# Patient Record
Sex: MALE | Race: WHITE | NOT HISPANIC OR LATINO | Employment: UNEMPLOYED | URBAN - METROPOLITAN AREA
[De-identification: names, ages, dates, MRNs, and addresses within clinical notes are randomized per-mention and may not be internally consistent; named-entity substitution may affect disease eponyms.]

---

## 2017-01-12 ENCOUNTER — HOSPITAL ENCOUNTER (EMERGENCY)
Facility: HOSPITAL | Age: 22
Discharge: LEFT BEFORE DISCHARGE | End: 2017-01-12
Attending: EMERGENCY MEDICINE

## 2017-01-12 VITALS
HEART RATE: 85 BPM | HEIGHT: 62 IN | SYSTOLIC BLOOD PRESSURE: 137 MMHG | DIASTOLIC BLOOD PRESSURE: 73 MMHG | TEMPERATURE: 97 F | BODY MASS INDEX: 26.68 KG/M2 | WEIGHT: 145 LBS | OXYGEN SATURATION: 97 % | RESPIRATION RATE: 16 BRPM

## 2017-01-12 DIAGNOSIS — Z53.29 LEFT AGAINST MEDICAL ADVICE: Primary | ICD-10-CM

## 2017-01-12 DIAGNOSIS — S42.92XA: ICD-10-CM

## 2017-01-12 PROCEDURE — 99285 EMERGENCY DEPT VISIT HI MDM: CPT

## 2017-01-12 PROCEDURE — 96374 THER/PROPH/DIAG INJ IV PUSH: CPT

## 2017-01-12 PROCEDURE — 96375 TX/PRO/DX INJ NEW DRUG ADDON: CPT

## 2017-01-12 PROCEDURE — 63600175 PHARM REV CODE 636 W HCPCS: Performed by: EMERGENCY MEDICINE

## 2017-01-12 RX ORDER — HYDROCODONE BITARTRATE AND ACETAMINOPHEN 5; 325 MG/1; MG/1
1 TABLET ORAL EVERY 4 HOURS PRN
Qty: 20 TABLET | Refills: 0 | Status: SHIPPED | OUTPATIENT
Start: 2017-01-12 | End: 2017-01-22

## 2017-01-12 RX ORDER — HYDROMORPHONE HYDROCHLORIDE 1 MG/ML
1 INJECTION, SOLUTION INTRAMUSCULAR; INTRAVENOUS; SUBCUTANEOUS
Status: COMPLETED | OUTPATIENT
Start: 2017-01-12 | End: 2017-01-12

## 2017-01-12 RX ORDER — LEVETIRACETAM 10 MG/ML
1000 INJECTION INTRAVASCULAR ONCE
Status: DISCONTINUED | OUTPATIENT
Start: 2017-01-12 | End: 2017-01-12 | Stop reason: HOSPADM

## 2017-01-12 RX ORDER — FENTANYL CITRATE 50 UG/ML
50 INJECTION, SOLUTION INTRAMUSCULAR; INTRAVENOUS
Status: COMPLETED | OUTPATIENT
Start: 2017-01-12 | End: 2017-01-12

## 2017-01-12 RX ADMIN — HYDROMORPHONE HYDROCHLORIDE 1 MG: 1 INJECTION, SOLUTION INTRAMUSCULAR; INTRAVENOUS; SUBCUTANEOUS at 02:01

## 2017-01-12 RX ADMIN — FENTANYL CITRATE 50 MCG: 50 INJECTION, SOLUTION INTRAMUSCULAR; INTRAVENOUS at 01:01

## 2017-01-12 NOTE — DISCHARGE INSTRUCTIONS
Shoulder Fracture  You have a break (fracture) of the shoulder. This may be a small crack in the bone. Or it may be a major break with the broken parts pushed out of position.    If you have only a crack in the bone and no bone fragments are out of place, you will probably be treated with a shoulder immobilizer. This is a special type of sling. Casts are not used for this type of fracture. Your bone should heal in 4 to 6 weeks. More serious injuries may need surgery to put the bones back into the correct position for healing.  Home care  Follow these tips to care for yourself at home:  · Leave the shoulder immobilizer in place. This will support the injured arm at your side. This is the best position for the bone to heal.  · The shoulder immobilizer is adjustable. If it becomes loose, adjust it so that your forearm is level with the ground (horizontal). Your hand should be level with your elbow.  · Apply an ice pack to the injured area for 20 minutes every 1 to 2 hours the first day. You can make an ice pack by putting ice cubes in a plastic bag. Wrap the bag in a towel before putting it on your shoulder. Continue with ice packs 3 to 4 times a day for the next 2 days. Then use the ice as needed to relieve pain and swelling.  · You may take acetaminophen or ibuprofen to relieve pain, unless another pain medicine was prescribed. If you have chronic liver or kidney disease or ever had a stomach ulcer or GI bleeding, talk with your doctor before using these medicines.  · Dont take the sling off before your next exam unless you were told to do so.  Follow-up care  Follow up with your doctor in 1 week to be sure the bone is healing properly. A shoulder joint will become stiff if left in a sling for too long. Ask your doctor when it is safe to begin range-of-motion exercises.  When to seek medical care  Get prompt medical care if any of these occur:  · Your fingers become swollen, cold, blue, numb, or tingly  · Your  shoulder or upper arm swells a lot or looks very bruised  · The pain in your shoulder gets worse  · The splint breaks  · You have a fever  © 4804-9656 [employer]. [address] [place] [address], [place] [address]. All rights reserved. This information is not intended as a substitute for professional medical care. Always follow your healthcare professional's instructions.

## 2017-01-12 NOTE — ED AVS SNAPSHOT
[facility] [address] [place] [address]               Anjel Martin   2017 12:48 AM   ED    Description:  Male : 1995   Department:  [facility]           Your Care was Coordinated By:     Provider Role From To    Jose Deng MD Attending Provider 17 0049 --      Reason for Visit     Dislocation           Diagnoses this Visit        Comments    Left against medical advice    -  Primary     Fracture dislocation of left shoulder joint, closed, initial encounter           ED Disposition     None           To Do List           Follow-up Information     Follow up with [facility].    Specialty:  Emergency Medicine    Why:  As needed, If symptoms worsen    Contact information:    [address] [place] [id number] [phone]        Schedule an appointment as soon as possible for a visit with [facility] - INPATIENT.    Contact information:    [address] [facility] [address]        [facility]     [facility] -  Assistance  Registered nurses in the [facility] provide clinical advisement, health education, appointment booking, and other advisory services.  Call for this free service at [phone].             Medications           Message regarding Medications     Verify the changes and/or additions to your medication regime listed below are the same as discussed with your clinician today.  If any of these changes or additions are incorrect, please notify your healthcare provider.        These medications were administered today        Dose Freq    fentaNYL 50 mcg/mL injection 50 mcg 50 mcg ED 1 Time    Sig: Inject 1 mL (50 mcg total) into the vein ED 1 Time.    Class: Normal    Route: Intravenous    hydromorphone (PF) injection 1 mg 1 mg ED 1 Time    Sig: Inject 1 mL (1 mg total) into the vein ED 1  "Time.    Class: Normal    Route: Intravenous    levetiracetam in NaCl (iso-os) IVPB 1,000 mg 1,000 mg Once    Sig: Inject 100 mLs (1,000 mg total) into the vein once.    Class: Normal    Route: Intravenous    Non-formulary Exception Code: Defer to pharmacy           Verify that the below list of medications is an accurate representation of the medications you are currently taking.  If none reported, the list may be blank. If incorrect, please contact your healthcare provider. Carry this list with you in case of emergency.           Current Medications     ibuprofen (ADVIL,MOTRIN) 400 MG tablet Take 1 tablet (400 mg total) by mouth every 6 (six) hours as needed.    levetiracetam in NaCl (iso-os) IVPB 1,000 mg Inject 100 mLs (1,000 mg total) into the vein once.           Clinical Reference Information           Your Vitals Were     BP Pulse Temp Resp Height Weight    134/89 (BP Location: Right arm, Patient Position: Lying) 69 97.1 °F (36.2 °C) (Oral) 16 5' 2" (1.575 m) 65.8 kg (145 lb)    SpO2 BMI             98% 26.52 kg/m2         Allergies as of 1/12/2017     No Known Allergies      Immunizations Administered on Date of Encounter - 1/12/2017     None      ED Micro, Lab, POCT     Start Ordered       Status Ordering Provider    01/12/17 0346 01/12/17 0345  CBC auto differential  STAT      Ordered     01/12/17 0346 01/12/17 0345  Comprehensive metabolic panel  STAT      Ordered     01/12/17 0346 01/12/17 0345  Protime-INR  STAT      Ordered       ED Imaging Orders     Start Ordered       Status Ordering Provider    01/12/17 0151 01/12/17 0151  CT Upper Extremity Wo Contrast Left  1 time imaging      In process     01/12/17 0100 01/12/17 0100  Xray Previous  1 time imaging     Comments:  Left shoulder xray. Transfer disc from [place]    Final result         Discharge Instructions         Shoulder Fracture  You have a break (fracture) of the shoulder. This may be a small crack in the bone. Or it may be a major break " with the broken parts pushed out of position.    If you have only a crack in the bone and no bone fragments are out of place, you will probably be treated with a shoulder immobilizer. This is a special type of sling. Casts are not used for this type of fracture. Your bone should heal in 4 to 6 weeks. More serious injuries may need surgery to put the bones back into the correct position for healing.  Home care  Follow these tips to care for yourself at home:  · Leave the shoulder immobilizer in place. This will support the injured arm at your side. This is the best position for the bone to heal.  · The shoulder immobilizer is adjustable. If it becomes loose, adjust it so that your forearm is level with the ground (horizontal). Your hand should be level with your elbow.  · Apply an ice pack to the injured area for 20 minutes every 1 to 2 hours the first day. You can make an ice pack by putting ice cubes in a plastic bag. Wrap the bag in a towel before putting it on your shoulder. Continue with ice packs 3 to 4 times a day for the next 2 days. Then use the ice as needed to relieve pain and swelling.  · You may take acetaminophen or ibuprofen to relieve pain, unless another pain medicine was prescribed. If you have chronic liver or kidney disease or ever had a stomach ulcer or GI bleeding, talk with your doctor before using these medicines.  · Dont take the sling off before your next exam unless you were told to do so.  Follow-up care  Follow up with your doctor in 1 week to be sure the bone is healing properly. A shoulder joint will become stiff if left in a sling for too long. Ask your doctor when it is safe to begin range-of-motion exercises.  When to seek medical care  Get prompt medical care if any of these occur:  · Your fingers become swollen, cold, blue, numb, or tingly  · Your shoulder or upper arm swells a lot or looks very bruised  · The pain in your shoulder gets worse  · The splint breaks  · You have a  fever  © 4222-0708 [employer]. [address] [place] [address], [place] [address]. All rights reserved. This information is not intended as a substitute for professional medical care. Always follow your healthcare professional's instructions.          [facility] Sign-Up     Activating your [facility] account is as easy as 1-2-3!     1) Visit [email or web address], select Sign Up Now, enter this activation code and your date of birth, then select Next.  [email or web address]  Expires: 2/26/2017  3:51 AM      2) Create a username and password to use when you visit [facility] in the future and select a security question in case you lose your password and select Next.    3) Enter your e-mail address and click Sign Up!    Additional Information  If you have questions, please e-mail [email or web address] or call [phone] to talk to our [facility] staff. Remember, [facility] is NOT to be used for urgent needs. For medical emergencies, dial 911.         Smoking Cessation     If you would like to quit smoking:   You may be eligible for free services if you are a [place] resident and started smoking cigarettes before September 1, 1988.  Call the Smoking Cessation Trust (SCT) toll free at (595) 523-5979 or (229) 888-8121.   Call 0-810-QUIT-NOW if you do not meet the above criteria.             [facility] complies with applicable Federal civil rights laws and does not discriminate on the basis of race, color, national origin, age, disability, or sex.        Language Assistance Services     ATTENTION: Language assistance services are available, free of charge. Please call [phone].      ATENCIÓN: Si habla español, tiene a prather disposición servicios gratuitos de asistencia lingüística. Llame al [phone].     CHÚ Ý: N?u b?n nói Ti?ng Vi?t, có các d?ch v? h? tr? ngôn ng? mi?n phí dành cho b?n. G?i s? [phone].

## 2017-01-12 NOTE — ED NOTES
Patient identifiers for Anjel Martin checked and correct.  LOC: Patient is awake, alert, and aware of environment with an appropriate affect. Patient is oriented x 3 and speaking appropriately.  APPEARANCE: Patient resting comfortably and in no acute distress. Patient is clean and well groomed, patient's clothing is properly fastened.  SKIN: The skin is warm and dry. Patient has normal skin turgor and moist mucus membrances. Skin is intact; no bruising or breakdown noted.  MUSCULOSKELETAL: Patient is moving all extremities well, Pt sent from [facility] for eval of dislocated left shoulder and possible fracture. Pt cooperative. Reports having a seizure today and falling. Step off noted to left shoulder. Reports 10/10 pain. Pulses intact.   RESPIRATORY: Airway is open and patent. Respirations are spontaneous and non-labored with normal effort and rate. Pt placed on continuous pulse ox.  CARDIAC: Patient has a normal rate and rhythm. No peripheral edema noted. Capillary refill < 3 seconds.   ABDOMEN: No distention noted. Bowel sounds active in all 4 quadrants. Soft and non-tender upon palpation.  NEUROLOGICAL: PERRL. Facial expression is symmetrical. Hand grasps are equal bilaterally. Normal sensation in all extremities when touched with finger.  Allergies reported: Review of patient's allergies indicates:  No Known Allergies  Bed locked, lowest position. Call light within easy reach. Side rails up x2.

## 2017-01-12 NOTE — ED PROVIDER NOTES
"Encounter Date: 1/12/2017    SCRIBE #1 NOTE: I, Azul murillo, am scribing for, and in the presence of, Dr Deng.       History     Chief Complaint   Patient presents with    Dislocation     pt had seizure and fell dislocating left shoulder; pt states he has had multiple dislocations of both shoulders     Review of patient's allergies indicates:  No Known Allergies  HPI Comments: 01/12/2017  1:09 AM     Chief Complaint: Dislocation      Anjel Martin is a 21 y.o. male with a pmhx of Dislocation of left shoulder joint and Seizures presenting to the [facility] from [place] for left shoulder dislocation. Pt had seizure tonight when he fell on the floor, dislocating left shoulder. He reports he has had several dislocations to both shoulders following seizures. Movement exacerbates pain and there are no alleviating factors. No numbness or weakness. He states he has been out of seizure medications for "several years."  Patient reports that he realizes he should be on seizure medication.   Pt has no past surgical history on file.      The history is provided by the patient and the EMS personnel.     Past Medical History   Diagnosis Date    Dislocation of left shoulder joint     Seizures      No past medical history pertinent negatives.  History reviewed. No pertinent past surgical history.  History reviewed. No pertinent family history.  Social History   Substance Use Topics    Smoking status: Current Every Day Smoker     Packs/day: 0.50     Types: Cigarettes    Smokeless tobacco: None    Alcohol use No     Review of Systems   Constitutional: Negative for fever.   HENT: Negative.    Respiratory: Negative for shortness of breath.    Cardiovascular: Negative for chest pain.   Gastrointestinal: Negative for abdominal pain.   Genitourinary: Negative for flank pain.   Musculoskeletal: Negative for arthralgias (L shoulder).   Skin: Negative for wound.   Neurological: Positive for seizures.   All other systems reviewed " and are negative.      Physical Exam   Initial Vitals   BP Pulse Resp Temp SpO2   01/12/17 0054 01/12/17 0054 01/12/17 0054 01/12/17 0054 01/12/17 0054   134/89 69 16 97.1 °F (36.2 °C) 98 %     Physical Exam    Nursing note and vitals reviewed.  Constitutional: He appears well-developed and well-nourished.   HENT:   Head: Normocephalic and atraumatic.   Eyes: Conjunctivae are normal.   Neck: Neck supple.   Cardiovascular: Normal rate, regular rhythm, normal heart sounds and intact distal pulses. Exam reveals no gallop and no friction rub.    No murmur heard.  Pulmonary/Chest: Breath sounds normal. He has no wheezes. He has no rhonchi. He has no rales.   Abdominal: Soft. He exhibits no distension. There is no tenderness.   Musculoskeletal:        Right shoulder: He exhibits decreased range of motion, tenderness, bony tenderness and deformity.        Left shoulder: He exhibits tenderness and deformity.   Neurological: He is alert and oriented to person, place, and time.   Normal sensation to light touch throughout the left upper extremity.  Sensation to the posterior left shoulder.   Skin: No rash noted. No erythema.   Psychiatric: He has a normal mood and affect.         ED Course   Procedures  Labs Reviewed - No data to display          Medical Decision Making:   History:   I obtained history from: someone other than patient.  Old Medical Records: I decided to obtain old medical records.  Old Records Summarized: records from another hospital.  Clinical Tests:   Radiological Study: Ordered and Reviewed            Scribe Attestation:   Scribe #1: I performed the above scribed service and the documentation accurately describes the services I performed. I attest to the accuracy of the note.    Attending Attestation:           Physician Attestation for Scribe:  Physician Attestation Statement for Scribe #1: I, Dr Deng, reviewed documentation, as scribed by Azul Valle in my presence, and it is both accurate and  complete.                 ED Course   Comment By Time   X-rays from [place] reviewed, patient appears to have old chronic changes but possibly a new humeral fracture. Case d/w dr payan, CT ordered Jose Deng MD 01/12 0151   Reviewed CT, case d/w dr payan, recommends transfer to [facility] given complexity of the fx dislocation.  Jose Deng MD 01/12 0331   Case discussed with dr almonte [facility] ortho who accepts patient Jose Deng MD 01/12 0339   Accepted at [facility], now wants to go AMA. Alert, oriented, not intoxicated. Understands consequences of leaving AMA, permanent disability or pain Jose Deng MD 01/12 4100     Clinical Impression:   The primary encounter diagnosis was Left against medical advice. A diagnosis of Fracture dislocation of left shoulder joint, closed, initial encounter was also pertinent to this visit.        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  Patient reports a seizure earlier today.  Reports noncompliance with seizure medications and neurology follow-up.  Patient reports he has been off of his seizure medications for many years.  CT scan demonstrates humeral fracture, glenoid fracture and a scapular fracture.  Case discussed with orthopedics here who recommends transfer.  Accepted at [facility] but prior to transfer the patient refused and left AGAINST MEDICAL ADVICE.  Patient states he has no way to get back to [place] from [place].  I did offer to try and transfer the patient to a hospital in [place] or even somewhere else on the [facility] and he refuses this.  This conversation was witnessed by nursing staff. Witnessed by JOHN Iqbal RN.  Also discussed the likelihood case management would be able to assist him in getting back to [place] from another facility and he still refuses.  IV removed, the patient states he is  walking back to [place] from our emergency department.  Refuses further attempts at care.  Not altered not confused and not impaired at this time.  No reason to hold him against his will.  Has capacity to understand that he is leaving with a fracture and dislocation of his left shoulder.  Was back to me the risks of leaving AGAINST MEDICAL ADVICE including permanent disability of the left arm.  Neurologically intact at time of departure from the ER.    Advised patient that they need admission transfer.  Patient refuses transfer to another hospital.  Alert and oriented to person place and situation.  I explained the risks of worsening condition, death etc in layman's terms to the patient.  Patient voices these risks back to me.   Patient is not altered and is not under the influence.  Patient is welcome to return to the hospital at any time if he chooses to do so.  I will Discharge the patient AGAINST MEDICAL ADVICE.       Jose Deng MD  01/12/17 8954

## 2018-07-01 ENCOUNTER — HOSPITAL ENCOUNTER (EMERGENCY)
Facility: HOSPITAL | Age: 23
Discharge: HOME | End: 2018-07-01
Attending: EMERGENCY MEDICINE

## 2018-07-01 VITALS
WEIGHT: 145 LBS | BODY MASS INDEX: 24.16 KG/M2 | SYSTOLIC BLOOD PRESSURE: 122 MMHG | RESPIRATION RATE: 18 BRPM | DIASTOLIC BLOOD PRESSURE: 82 MMHG | OXYGEN SATURATION: 99 % | HEART RATE: 92 BPM | HEIGHT: 65 IN | TEMPERATURE: 99 F

## 2018-07-01 DIAGNOSIS — R56.9 SEIZURE: Primary | ICD-10-CM

## 2018-07-01 LAB
AMPHET+METHAMPHET UR QL: NEGATIVE
ANION GAP SERPL CALC-SCNC: 10 MMOL/L
APAP SERPL-MCNC: <3 UG/ML
BARBITURATES UR QL SCN>200 NG/ML: NEGATIVE
BASOPHILS # BLD AUTO: 0.2 K/UL
BASOPHILS NFR BLD: 1.3 %
BENZODIAZ UR QL SCN>200 NG/ML: NEGATIVE
BUN SERPL-MCNC: 5 MG/DL
BZE UR QL SCN: NEGATIVE
CALCIUM SERPL-MCNC: 9.7 MG/DL
CANNABINOIDS UR QL SCN: NORMAL
CHLORIDE SERPL-SCNC: 112 MMOL/L
CO2 SERPL-SCNC: 19 MMOL/L
CREAT SERPL-MCNC: 1 MG/DL
CREAT UR-MCNC: 58.3 MG/DL
DIFFERENTIAL METHOD: ABNORMAL
EOSINOPHIL # BLD AUTO: 0.1 K/UL
EOSINOPHIL NFR BLD: 1.2 %
ERYTHROCYTE [DISTWIDTH] IN BLOOD BY AUTOMATED COUNT: 14.2 %
EST. GFR  (AFRICAN AMERICAN): >60 ML/MIN/1.73 M^2
EST. GFR  (NON AFRICAN AMERICAN): >60 ML/MIN/1.73 M^2
ETHANOL SERPL-MCNC: <10 MG/DL
GLUCOSE SERPL-MCNC: 111 MG/DL
HCT VFR BLD AUTO: 38.9 %
HGB BLD-MCNC: 13.2 G/DL
LYMPHOCYTES # BLD AUTO: 2.1 K/UL
LYMPHOCYTES NFR BLD: 17.1 %
MCH RBC QN AUTO: 30.5 PG
MCHC RBC AUTO-ENTMCNC: 33.9 G/DL
MCV RBC AUTO: 90 FL
METHADONE UR QL SCN>300 NG/ML: NEGATIVE
MONOCYTES # BLD AUTO: 1.4 K/UL
MONOCYTES NFR BLD: 11.9 %
NEUTROPHILS # BLD AUTO: 8.3 K/UL
NEUTROPHILS NFR BLD: 68.5 %
OPIATES UR QL SCN: NEGATIVE
PCP UR QL SCN>25 NG/ML: NEGATIVE
PLATELET # BLD AUTO: 277 K/UL
PMV BLD AUTO: 8.4 FL
POTASSIUM SERPL-SCNC: 3.2 MMOL/L
RBC # BLD AUTO: 4.33 M/UL
SALICYLATES SERPL-MCNC: <5 MG/DL
SODIUM SERPL-SCNC: 141 MMOL/L
TOXICOLOGY INFORMATION: NORMAL
WBC # BLD AUTO: 12.1 K/UL

## 2018-07-01 PROCEDURE — 80048 BASIC METABOLIC PNL TOTAL CA: CPT

## 2018-07-01 PROCEDURE — 80329 ANALGESICS NON-OPIOID 1 OR 2: CPT

## 2018-07-01 PROCEDURE — 85025 COMPLETE CBC W/AUTO DIFF WBC: CPT

## 2018-07-01 PROCEDURE — 99284 EMERGENCY DEPT VISIT MOD MDM: CPT | Mod: 25

## 2018-07-01 PROCEDURE — 93010 ELECTROCARDIOGRAM REPORT: CPT | Mod: ,,, | Performed by: INTERNAL MEDICINE

## 2018-07-01 PROCEDURE — 36415 COLL VENOUS BLD VENIPUNCTURE: CPT

## 2018-07-01 PROCEDURE — 80307 DRUG TEST PRSMV CHEM ANLYZR: CPT

## 2018-07-01 PROCEDURE — 96365 THER/PROPH/DIAG IV INF INIT: CPT

## 2018-07-01 PROCEDURE — 96375 TX/PRO/DX INJ NEW DRUG ADDON: CPT

## 2018-07-01 PROCEDURE — 63600175 PHARM REV CODE 636 W HCPCS: Performed by: EMERGENCY MEDICINE

## 2018-07-01 PROCEDURE — 25000003 PHARM REV CODE 250: Performed by: EMERGENCY MEDICINE

## 2018-07-01 PROCEDURE — 80320 DRUG SCREEN QUANTALCOHOLS: CPT

## 2018-07-01 PROCEDURE — 93005 ELECTROCARDIOGRAM TRACING: CPT

## 2018-07-01 RX ORDER — LORAZEPAM 2 MG/ML
1 INJECTION INTRAMUSCULAR
Status: COMPLETED | OUTPATIENT
Start: 2018-07-01 | End: 2018-07-01

## 2018-07-01 RX ORDER — LEVETIRACETAM 500 MG/1
500 TABLET ORAL 2 TIMES DAILY
Status: ON HOLD | COMMUNITY
End: 2018-07-13 | Stop reason: HOSPADM

## 2018-07-01 RX ORDER — POTASSIUM CHLORIDE 750 MG/1
30 TABLET, EXTENDED RELEASE ORAL
Status: COMPLETED | OUTPATIENT
Start: 2018-07-01 | End: 2018-07-01

## 2018-07-01 RX ORDER — TOPIRAMATE SPINKLE 25 MG/1
25 CAPSULE ORAL 2 TIMES DAILY
Status: ON HOLD | COMMUNITY
End: 2018-07-13 | Stop reason: HOSPADM

## 2018-07-01 RX ADMIN — SODIUM CHLORIDE, SODIUM LACTATE, POTASSIUM CHLORIDE, AND CALCIUM CHLORIDE 1000 ML: .6; .31; .03; .02 INJECTION, SOLUTION INTRAVENOUS at 05:07

## 2018-07-01 RX ADMIN — LORAZEPAM 1 MG: 2 INJECTION, SOLUTION INTRAMUSCULAR; INTRAVENOUS at 04:07

## 2018-07-01 RX ADMIN — POTASSIUM CHLORIDE 30 MEQ: 750 TABLET, EXTENDED RELEASE ORAL at 05:07

## 2018-07-01 NOTE — ED PROVIDER NOTES
Encounter Date: 7/1/2018    SCRIBE #1 NOTE: ILillie, am scribing for, and in the presence of, .       History     Chief Complaint   Patient presents with    Seizures     tonic-clonic seizure approx 1 hour ago (Hx of same)       07/01/2018 4:07 PM     Chief complaint: seizures      Anjel Martin is a 23 y.o. male with paranoid schizophrenic and seizure disorderwho presents to the ED with seizures. Patient had 11 seizures within the last two days and his grand-dad states he has been non-compliant with his medications. Patient last had a seizure 1 hour PTA and feel down a few stairs. Patient was recently withdrawn from a court ordered crisis unit for a psychiatric purposes. He states he feels horrible and his grandfather states the patient's aunt witnessed the seizure. Patient denies any suicidal ideations, homicidal ideations, hallucinations, neck pain, back pain,  urinary issues, or changes in vision.      The history is provided by the patient. No  was used.     Review of patient's allergies indicates:  No Known Allergies  Past Medical History:   Diagnosis Date    Dislocation of left shoulder joint     Seizures      History reviewed. No pertinent surgical history.  History reviewed. No pertinent family history.  Social History   Substance Use Topics    Smoking status: Current Every Day Smoker     Packs/day: 0.50     Types: Cigarettes    Smokeless tobacco: Not on file    Alcohol use No     Review of Systems   Constitutional: Negative for fever.   HENT: Negative for sore throat.    Respiratory: Negative for shortness of breath.    Cardiovascular: Negative for chest pain.   Gastrointestinal: Negative for nausea.   Genitourinary: Negative for dysuria.   Musculoskeletal: Negative for back pain.   Skin: Negative for rash.   Neurological: Positive for seizures. Negative for weakness.   Hematological: Does not bruise/bleed easily.   Psychiatric/Behavioral: Negative for  hallucinations and suicidal ideas.       Physical Exam     Initial Vitals [07/01/18 1554]   BP Pulse Resp Temp SpO2   130/76 (!) 132 18 99.4 °F (37.4 °C) 97 %      MAP       --         Physical Exam    Nursing note and vitals reviewed.  Constitutional: He appears well-developed and well-nourished. He is not diaphoretic. No distress.   HENT:   Head: Normocephalic.   Mouth/Throat: Oropharynx is clear and moist.   Abrasions to right temporal and frontal region. No periorbital ecchymosis. Able to rotate head 45' to the left and right without pain.   Eyes: Conjunctivae are normal.   Neck: Normal range of motion. Neck supple.   Cardiovascular: Normal rate, regular rhythm, normal heart sounds and intact distal pulses. Exam reveals no gallop and no friction rub.    No murmur heard.  Pulmonary/Chest: Breath sounds normal. He has no wheezes. He has no rhonchi. He has no rales.   Abdominal: Soft. He exhibits no distension. There is no tenderness.   Musculoskeletal: Normal range of motion. He exhibits no tenderness.   Neurological: He is alert and oriented to person, place, and time. He has normal strength. No cranial nerve deficit or sensory deficit.   CN III-XII intact    Skin: No rash noted. No erythema.   Psychiatric:   Demonstrates some depersonalization denies hallucinations, SI, and HI.               ED Course   Procedures  Labs Reviewed   CBC W/ AUTO DIFFERENTIAL - Abnormal; Notable for the following:        Result Value    RBC 4.33 (*)     Hemoglobin 13.2 (*)     Hematocrit 38.9 (*)     MPV 8.4 (*)     Gran # (ANC) 8.3 (*)     Mono # 1.4 (*)     Lymph% 17.1 (*)     All other components within normal limits   BASIC METABOLIC PANEL - Abnormal; Notable for the following:     Potassium 3.2 (*)     Chloride 112 (*)     CO2 19 (*)     Glucose 111 (*)     BUN, Bld 5 (*)     All other components within normal limits   ACETAMINOPHEN LEVEL - Abnormal; Notable for the following:     Acetaminophen (Tylenol), Serum <3.0 (*)     All  other components within normal limits   SALICYLATE LEVEL - Abnormal; Notable for the following:     Salicylate Lvl <5.0 (*)     All other components within normal limits   DRUG SCREEN PANEL, URINE EMERGENCY   ALCOHOL,MEDICAL (ETHANOL)          Imaging Results          CT Head Without Contrast (In process)                  Medical Decision Making:   History:   Old Medical Records: I decided to obtain old medical records.  Clinical Tests:   Lab Tests: Ordered and Reviewed  Radiological Study: Ordered and Reviewed  Medical Tests: Ordered and Reviewed            Scribe Attestation:   Scribe #1: I performed the above scribed service and the documentation accurately describes the services I performed. I attest to the accuracy of the note.    I, Dr. Roly Goldsmith, personally performed the services described in this documentation. All medical record entries made by the scribe were at my direction and in my presence.  I have reviewed the chart and agree that the record reflects my personal performance and is accurate and complete. Roly Goldsmith MD.  9:18 PM 07/01/2018    Anjel Martin is a 23 y.o. male presenting with recurrent seizure in the setting of medical noncompliance with drug and alcohol use.  Patient is clearly not forthcoming with caregivers and family later illicits patient's admission he has been drinking alcohol as well, likely lowering seizure threshold.  Patient here is asymptomatic with mental status consistent with prior according the family.  Head CT done due to signs of possible recent head trauma with no sign of intracranial hemorrhage. I doubt other acute intracranial processes.  I do not think LP or other brain imaging is indicated.  Laboratories reviewed.  Tachycardia resolved with IV fluids, observation, and single dose of benzodiazepine given with consideration of possible acute intoxication based on clinical assessment.  He has no further seizures here and is instructed to continue taking  his antiepileptic medications he has at home.  Follow-up also with Psychiatry.  I do not think PC is indicated.  He is cooperative and appropriate here.  No sign of arrhythmia on EKG.  There is no sign of other end-organ dysfunction requiring hospitalization.  Return precautions reviewed.        ED Course as of Jul 01 2120   Sun Jul 01, 2018   1627 EKG:  Sinus tachycardia, rate of 123, normal intervals.  There are no acute ST or T wave changes suggestive of acute ischemia or infarction.  No arrhythmia.    [MR]   1830 CT-H:  NAD. (rad read)  [MR]      ED Course User Index  [MR] Roly Goldsmith MD     Clinical Impression:   The encounter diagnosis was Seizure.      Disposition:   Disposition: Discharged  Condition: Stable                        Roly Goldsmith MD  07/01/18 2120

## 2018-07-02 ENCOUNTER — HOSPITAL ENCOUNTER (EMERGENCY)
Facility: HOSPITAL | Age: 23
Discharge: PSYCHIATRIC OR SUBSTANCE USE FACILITY | End: 2018-07-03
Attending: EMERGENCY MEDICINE

## 2018-07-02 DIAGNOSIS — F29 PSYCHOSIS, UNSPECIFIED PSYCHOSIS TYPE: ICD-10-CM

## 2018-07-02 DIAGNOSIS — F22 PARANOIA: ICD-10-CM

## 2018-07-02 DIAGNOSIS — G40.919 INTRACTABLE EPILEPSY WITHOUT STATUS EPILEPTICUS, UNSPECIFIED EPILEPSY TYPE: Primary | ICD-10-CM

## 2018-07-02 DIAGNOSIS — Z91.199 MEDICAL NON-COMPLIANCE: ICD-10-CM

## 2018-07-02 LAB
ALBUMIN SERPL BCP-MCNC: 4.2 G/DL
ALP SERPL-CCNC: 88 U/L
ALT SERPL W/O P-5'-P-CCNC: 14 U/L
ANION GAP SERPL CALC-SCNC: 10 MMOL/L
APAP SERPL-MCNC: <3 UG/ML
AST SERPL-CCNC: 19 U/L
BASOPHILS # BLD AUTO: 0.1 K/UL
BASOPHILS NFR BLD: 0.7 %
BILIRUB SERPL-MCNC: 0.2 MG/DL
BUN SERPL-MCNC: 7 MG/DL
CALCIUM SERPL-MCNC: 9.2 MG/DL
CHLORIDE SERPL-SCNC: 113 MMOL/L
CO2 SERPL-SCNC: 18 MMOL/L
CREAT SERPL-MCNC: 0.8 MG/DL
DIFFERENTIAL METHOD: ABNORMAL
EOSINOPHIL # BLD AUTO: 0.1 K/UL
EOSINOPHIL NFR BLD: 1.4 %
ERYTHROCYTE [DISTWIDTH] IN BLOOD BY AUTOMATED COUNT: 14.2 %
EST. GFR  (AFRICAN AMERICAN): >60 ML/MIN/1.73 M^2
EST. GFR  (NON AFRICAN AMERICAN): >60 ML/MIN/1.73 M^2
ETHANOL SERPL-MCNC: <10 MG/DL
GLUCOSE SERPL-MCNC: 96 MG/DL
HCT VFR BLD AUTO: 38.7 %
HGB BLD-MCNC: 13.1 G/DL
LYMPHOCYTES # BLD AUTO: 2.2 K/UL
LYMPHOCYTES NFR BLD: 21.2 %
MCH RBC QN AUTO: 30.7 PG
MCHC RBC AUTO-ENTMCNC: 33.8 G/DL
MCV RBC AUTO: 91 FL
MONOCYTES # BLD AUTO: 1 K/UL
MONOCYTES NFR BLD: 9.9 %
NEUTROPHILS # BLD AUTO: 6.9 K/UL
NEUTROPHILS NFR BLD: 66.8 %
PLATELET # BLD AUTO: 267 K/UL
PMV BLD AUTO: 8.1 FL
POTASSIUM SERPL-SCNC: 3.2 MMOL/L
PROT SERPL-MCNC: 6.8 G/DL
RBC # BLD AUTO: 4.27 M/UL
SODIUM SERPL-SCNC: 141 MMOL/L
TSH SERPL DL<=0.005 MIU/L-ACNC: 1.52 UIU/ML
WBC # BLD AUTO: 10.3 K/UL

## 2018-07-02 PROCEDURE — 99285 EMERGENCY DEPT VISIT HI MDM: CPT | Mod: 25

## 2018-07-02 PROCEDURE — 85025 COMPLETE CBC W/AUTO DIFF WBC: CPT

## 2018-07-02 PROCEDURE — 80329 ANALGESICS NON-OPIOID 1 OR 2: CPT

## 2018-07-02 PROCEDURE — 63600175 PHARM REV CODE 636 W HCPCS: Performed by: EMERGENCY MEDICINE

## 2018-07-02 PROCEDURE — 84443 ASSAY THYROID STIM HORMONE: CPT

## 2018-07-02 PROCEDURE — 96365 THER/PROPH/DIAG IV INF INIT: CPT

## 2018-07-02 PROCEDURE — 80320 DRUG SCREEN QUANTALCOHOLS: CPT

## 2018-07-02 PROCEDURE — 96372 THER/PROPH/DIAG INJ SC/IM: CPT

## 2018-07-02 PROCEDURE — 80177 DRUG SCRN QUAN LEVETIRACETAM: CPT

## 2018-07-02 PROCEDURE — 96366 THER/PROPH/DIAG IV INF ADDON: CPT

## 2018-07-02 PROCEDURE — 36415 COLL VENOUS BLD VENIPUNCTURE: CPT

## 2018-07-02 PROCEDURE — 96367 TX/PROPH/DG ADDL SEQ IV INF: CPT

## 2018-07-02 PROCEDURE — 80053 COMPREHEN METABOLIC PANEL: CPT

## 2018-07-02 RX ORDER — LEVETIRACETAM 500 MG/1
1500 TABLET ORAL
Status: DISCONTINUED | OUTPATIENT
Start: 2018-07-02 | End: 2018-07-02

## 2018-07-02 RX ORDER — HALOPERIDOL 5 MG/ML
10 INJECTION INTRAMUSCULAR
Status: COMPLETED | OUTPATIENT
Start: 2018-07-02 | End: 2018-07-02

## 2018-07-02 RX ORDER — LORAZEPAM 2 MG/ML
2 INJECTION INTRAMUSCULAR
Status: COMPLETED | OUTPATIENT
Start: 2018-07-02 | End: 2018-07-02

## 2018-07-02 RX ORDER — DIPHENHYDRAMINE HYDROCHLORIDE 50 MG/ML
50 INJECTION INTRAMUSCULAR; INTRAVENOUS
Status: COMPLETED | OUTPATIENT
Start: 2018-07-02 | End: 2018-07-02

## 2018-07-02 RX ORDER — LEVETIRACETAM 15 MG/ML
1500 INJECTION INTRAVASCULAR
Status: COMPLETED | OUTPATIENT
Start: 2018-07-02 | End: 2018-07-02

## 2018-07-02 RX ORDER — POTASSIUM CHLORIDE 7.45 MG/ML
10 INJECTION INTRAVENOUS
Status: COMPLETED | OUTPATIENT
Start: 2018-07-02 | End: 2018-07-03

## 2018-07-02 RX ORDER — METRONIDAZOLE 500 MG/100ML
500 INJECTION, SOLUTION INTRAVENOUS
Status: DISCONTINUED | OUTPATIENT
Start: 2018-07-02 | End: 2018-07-02

## 2018-07-02 RX ADMIN — LORAZEPAM 2 MG: 2 INJECTION, SOLUTION INTRAMUSCULAR; INTRAVENOUS at 05:07

## 2018-07-02 RX ADMIN — HALOPERIDOL LACTATE 10 MG: 5 INJECTION, SOLUTION INTRAMUSCULAR at 05:07

## 2018-07-02 RX ADMIN — POTASSIUM CHLORIDE 10 MEQ: 7.46 INJECTION, SOLUTION INTRAVENOUS at 08:07

## 2018-07-02 RX ADMIN — LEVETIRACETAM 1500 MG: 15 INJECTION INTRAVENOUS at 07:07

## 2018-07-02 RX ADMIN — DIPHENHYDRAMINE HYDROCHLORIDE 50 MG: 50 INJECTION, SOLUTION INTRAMUSCULAR; INTRAVENOUS at 05:07

## 2018-07-02 NOTE — ED NOTES
Restraints removed per Emma RN for absence of behavior requiring restraints. Patient then yelled out while urinating on self. Linens and paper scrubs changed.

## 2018-07-02 NOTE — ED NOTES
A, a, oriented to hospital. States smoked marijuana. Speaks very slowly and smiles inappropriatly. Denies SI and HI. Only answers few questions. NAD. Stares but won't talk. Non- combative. SR up x2.

## 2018-07-02 NOTE — ED NOTES
4 point restraints applied per order due to patient grabbing and twisting 's forearm and grabbing needle from her as she attempted to draw his blood.

## 2018-07-02 NOTE — ED PROVIDER NOTES
Encounter Date: 7/2/2018    SCRIBE #1 NOTE: I, Yocasta Fagan, am scribing for, and in the presence of, Dr. Vegas.       History     Chief Complaint   Patient presents with    Mental Health Problem       07/02/2018 4:42 PM     Chief complaint: Psychiatric evaluation      Anjel Martin is a 23 y.o. male with epilepsy and schizophrenia who presents to the ED via EMS for a Psychiatric evaluation. Unable to obtain HPI/ROS as the patient refuses to speak. No SHx noted. No known drug allergies noted.      The history is provided by the patient.     Review of patient's allergies indicates:  No Known Allergies  Past Medical History:   Diagnosis Date    Dislocation of left shoulder joint     Psychiatric disorder     Seizures      History reviewed. No pertinent surgical history.  History reviewed. No pertinent family history.  Social History   Substance Use Topics    Smoking status: Current Every Day Smoker     Packs/day: 0.50     Types: Cigarettes    Smokeless tobacco: Not on file    Alcohol use No     Review of Systems   Unable to perform ROS: Psychiatric disorder     Physical Exam     Vitals:    07/02/18 1559   BP: 126/76   Pulse: 98   Resp: 18   SpO2: 100%   Weight: 68 kg (150 lb)     Physical Exam    Nursing note and vitals reviewed.  Constitutional: He appears well-developed and well-nourished. No distress.   HENT:   Head: Normocephalic and atraumatic.   Eyes: Conjunctivae and EOM are normal. Pupils are equal, round, and reactive to light.   Neck: Neck supple.   Cardiovascular: Normal rate, regular rhythm and normal heart sounds. Exam reveals no gallop and no friction rub.    No murmur heard.  Pulmonary/Chest: Breath sounds normal. No respiratory distress. He has no wheezes. He has no rhonchi. He has no rales.   Musculoskeletal: Normal range of motion. He exhibits no edema or tenderness.   Neurological: He is alert and oriented to person, place, and time.   Skin: Skin is warm and dry.   Psychiatric: He is  withdrawn. Thought content is paranoid.   Purposefully nonverbal. Responding to internal stimuli. Unable to assess SI, HI, hallucinations, or delusions.        ED Course   Critical Care  Date/Time: 7/2/2018 7:08 PM  Performed by: FAVIO TRINIDAD  Authorized by: FAVIO TRINIDAD   Direct patient critical care time: 25 minutes  Additional history critical care time: 5 minutes  Ordering / reviewing critical care time: 5 minutes  Documentation critical care time: 5 minutes  Consulting other physicians critical care time: 5 minutes  Total critical care time (exclusive of procedural time) : 45 minutes  Critical care was necessary to treat or prevent imminent or life-threatening deterioration of the following conditions: Psychiatric crisis requiring chemical and physical restraint.  Critical care was time spent personally by me on the following activities: evaluation of patient's response to treatment, examination of patient, ordering and performing treatments and interventions, ordering and review of laboratory studies, pulse oximetry and re-evaluation of patient's condition.        Labs Reviewed   CBC W/ AUTO DIFFERENTIAL   COMPREHENSIVE METABOLIC PANEL   TSH   URINALYSIS   DRUG SCREEN PANEL, URINE EMERGENCY   ALCOHOL,MEDICAL (ETHANOL)   ACETAMINOPHEN LEVEL        Imaging Results    None          Medical Decision Making:   History:   Old Medical Records: I decided to obtain old medical records.  Clinical Tests:   Lab Tests: Reviewed and Ordered  Patient has a history of paranoid schizophrenia and chronic seizures.  He presents emergency room with friends for evaluation of altered mental status.  The patient was very withdrawn.  He did not want to talk.  He did not appear to be postictal.  He was seen here yesterday for seizures and had negative CT of the head.  Patient has had several seizures recently but has been noncompliant with his medication drinking alcohol and using drugs.  We will restart his Keppra here after  "IV Keppra load.    I have not appreciated any seizure activity here in the ED.  He had a negative CT of the head yesterday.  He has mild hypokalemia here and I will replace his potassium.  Alcohol level is less than 10.  He has no history of alcohol withdrawal seizures according to note yesterday from family.    I spoke with the patient's mother and his sister and his uncle who all state that he has had increasing paranoid behavior over the past few months that is really increased over the past week and a half.  The patient last week through his Bible and cellphone and street and was dancing in the street.  The patient has had episodes where he does not want to talk to anyone at the house.  He he feels that cameras are watching him.  He feels that he has metal plates in his head.  He has no formal diagnosis of schizophrenia.  He has been hospitalized in a psychiatric hospital years ago.  He is not on any antipsychotics.  He only takes medicines for seizures when he can afford them and has been off of them for an undisclosed amount of time.  I spoke with his on-call who states he was recently in a crisis stabilization unit in [facility] where they discharged him after 7 days stay with Keppra Topamax and Zyprexa.  Zyprexa was new for him.  He is not able to afford any of the medications and has not been taking them.    7:34 PM patient is medically cleared for transfer to a psychiatric hospital.            Scribe Attestation:   Scribe #1: I performed the above scribed service and the documentation accurately describes the services I performed. I attest to the accuracy of the note.    Attending Attestation:             Attending ED Notes:   5:09 PM  Patient grabbed the Phlebotomist's hand while attempting to draw blood to turn the needle around on her. Patient will be chemically and physically treated with 4 soft restraints.     5:35 PM  Patient yelling "Help" after urinating in the bed.     7:05 PM   Patient " resting comfortably.  I, Dr. Loy Vegas personally performed the services described in this documentation. All medical record entries made by the scribe were at my direction and in my presence.  I have reviewed the chart and agree that the record reflects my personal performance and is accurate and complete. Loy Vegas MD.  7:07 PM 07/02/2018    DISCLAIMER: This note was prepared with Dragon NaturallySpeaking voice recognition transcription software. Garbled syntax, mangled pronouns, and other bizarre constructions may be attributed to that software system            Clinical Impression:   The primary encounter diagnosis was Intractable epilepsy without status epilepticus, unspecified epilepsy type. Diagnoses of Medical non-compliance, Paranoia, and Psychosis, unspecified psychosis type were also pertinent to this visit.                             Loy Vegas MD  07/02/18 1908       Loy Vegas MD  07/02/18 1933       Loy Vegas MD  07/02/18 1934       Loy Vegas MD  07/02/18 1938

## 2018-07-03 VITALS
BODY MASS INDEX: 24.96 KG/M2 | HEART RATE: 80 BPM | WEIGHT: 150 LBS | OXYGEN SATURATION: 100 % | DIASTOLIC BLOOD PRESSURE: 81 MMHG | RESPIRATION RATE: 18 BRPM | TEMPERATURE: 98 F | SYSTOLIC BLOOD PRESSURE: 132 MMHG

## 2018-07-03 PROBLEM — F29 PSYCHOSIS: Status: ACTIVE | Noted: 2018-07-03

## 2018-07-03 LAB
AMPHET+METHAMPHET UR QL: NEGATIVE
BARBITURATES UR QL SCN>200 NG/ML: NEGATIVE
BENZODIAZ UR QL SCN>200 NG/ML: NEGATIVE
BILIRUB UR QL STRIP: NEGATIVE
BZE UR QL SCN: NEGATIVE
CANNABINOIDS UR QL SCN: NORMAL
CLARITY UR: CLEAR
COLOR UR: YELLOW
CREAT UR-MCNC: 113.5 MG/DL
GLUCOSE UR QL STRIP: NEGATIVE
HGB UR QL STRIP: NEGATIVE
KETONES UR QL STRIP: NEGATIVE
LEUKOCYTE ESTERASE UR QL STRIP: NEGATIVE
METHADONE UR QL SCN>300 NG/ML: NEGATIVE
NITRITE UR QL STRIP: NEGATIVE
OPIATES UR QL SCN: NEGATIVE
PCP UR QL SCN>25 NG/ML: NEGATIVE
PH UR STRIP: 7 [PH] (ref 5–8)
PROT UR QL STRIP: NEGATIVE
SP GR UR STRIP: 1.01 (ref 1–1.03)
TOXICOLOGY INFORMATION: NORMAL
URN SPEC COLLECT METH UR: NORMAL
UROBILINOGEN UR STRIP-ACNC: 1 EU/DL

## 2018-07-03 PROCEDURE — 81003 URINALYSIS AUTO W/O SCOPE: CPT | Mod: 59

## 2018-07-03 PROCEDURE — 80307 DRUG TEST PRSMV CHEM ANLYZR: CPT

## 2018-07-03 NOTE — ED NOTES
Faxed PEC packet to [facility], [facility], [facility], and [facility].

## 2018-07-03 NOTE — ED NOTES
Sitter at bedside with direct observation. Safe environment maintained. Pt calm and cooperative. Monitoring continues.

## 2018-07-03 NOTE — ED NOTES
Admit packet faxed to [facility], [facility], [facility], [facility], [facility], [facility], [facility], [facility], [facility], Waiting for response.

## 2018-07-03 NOTE — ED NOTES
Rounding on the pt performed and pt updated on plan of care. Sitter at bedside with direct observation. Pt calm and cooperative with safe environment maintained. Monitoring continues

## 2018-07-03 NOTE — ED NOTES
Sitter at bedside with direct observation. Pt resting with eyes closed and no distress noted. Monitoring continues.

## 2018-07-03 NOTE — ED NOTES
[facility] states they have yet to receive an intake on pt. Will call back when ready for report

## 2018-07-03 NOTE — ED NOTES
Sitter at bedside with direct observation. Safe environment maintained. Pt resting with eyes closed and no distress noted. Monitoring continues

## 2018-07-05 LAB — LEVETIRACETAM SERPL-MCNC: <1 UG/ML (ref 3–60)

## 2018-07-13 PROBLEM — F06.2 PSYCHOTIC DISORDER DUE TO ANOTHER MEDICAL CONDITION WITH DELUSIONS: Status: ACTIVE | Noted: 2018-07-03

## 2018-07-23 ENCOUNTER — HOSPITAL ENCOUNTER (EMERGENCY)
Facility: HOSPITAL | Age: 23
Discharge: PSYCHIATRIC OR SUBSTANCE USE FACILITY | End: 2018-07-23
Attending: EMERGENCY MEDICINE

## 2018-07-23 VITALS
RESPIRATION RATE: 16 BRPM | HEIGHT: 62 IN | BODY MASS INDEX: 25.58 KG/M2 | HEART RATE: 101 BPM | TEMPERATURE: 98 F | SYSTOLIC BLOOD PRESSURE: 132 MMHG | OXYGEN SATURATION: 97 % | DIASTOLIC BLOOD PRESSURE: 74 MMHG | WEIGHT: 139 LBS

## 2018-07-23 DIAGNOSIS — Z00.8 MEDICAL CLEARANCE FOR PSYCHIATRIC ADMISSION: ICD-10-CM

## 2018-07-23 DIAGNOSIS — F23 ACUTE PSYCHOSIS: Primary | ICD-10-CM

## 2018-07-23 PROBLEM — F29 PSYCHOSIS: Status: ACTIVE | Noted: 2018-07-23

## 2018-07-23 LAB
ALBUMIN SERPL BCP-MCNC: 4.4 G/DL
ALP SERPL-CCNC: 87 U/L
ALT SERPL W/O P-5'-P-CCNC: 11 U/L
AMPHET+METHAMPHET UR QL: NEGATIVE
ANION GAP SERPL CALC-SCNC: 8 MMOL/L
APAP SERPL-MCNC: <3 UG/ML
AST SERPL-CCNC: 16 U/L
BACTERIA #/AREA URNS HPF: NORMAL /HPF
BARBITURATES UR QL SCN>200 NG/ML: NEGATIVE
BASOPHILS # BLD AUTO: 0 K/UL
BASOPHILS NFR BLD: 0 %
BENZODIAZ UR QL SCN>200 NG/ML: NEGATIVE
BILIRUB SERPL-MCNC: 0.2 MG/DL
BILIRUB UR QL STRIP: NEGATIVE
BUN SERPL-MCNC: 6 MG/DL
BZE UR QL SCN: NEGATIVE
CALCIUM SERPL-MCNC: 9.6 MG/DL
CANNABINOIDS UR QL SCN: NORMAL
CHLORIDE SERPL-SCNC: 105 MMOL/L
CLARITY UR: CLEAR
CO2 SERPL-SCNC: 27 MMOL/L
COLOR UR: YELLOW
CREAT SERPL-MCNC: 0.9 MG/DL
CREAT UR-MCNC: 200.8 MG/DL
DIFFERENTIAL METHOD: ABNORMAL
EOSINOPHIL # BLD AUTO: 0.2 K/UL
EOSINOPHIL NFR BLD: 2 %
ERYTHROCYTE [DISTWIDTH] IN BLOOD BY AUTOMATED COUNT: 13.1 %
EST. GFR  (AFRICAN AMERICAN): >60 ML/MIN/1.73 M^2
EST. GFR  (NON AFRICAN AMERICAN): >60 ML/MIN/1.73 M^2
ETHANOL SERPL-MCNC: <10 MG/DL
GLUCOSE SERPL-MCNC: 98 MG/DL
GLUCOSE UR QL STRIP: NEGATIVE
HCT VFR BLD AUTO: 38.6 %
HGB BLD-MCNC: 13.2 G/DL
HGB UR QL STRIP: ABNORMAL
KETONES UR QL STRIP: NEGATIVE
LEUKOCYTE ESTERASE UR QL STRIP: ABNORMAL
LYMPHOCYTES # BLD AUTO: 2.2 K/UL
LYMPHOCYTES NFR BLD: 19.7 %
MCH RBC QN AUTO: 31.2 PG
MCHC RBC AUTO-ENTMCNC: 34.2 G/DL
MCV RBC AUTO: 91 FL
METHADONE UR QL SCN>300 NG/ML: NEGATIVE
MICROSCOPIC COMMENT: NORMAL
MONOCYTES # BLD AUTO: 1 K/UL
MONOCYTES NFR BLD: 8.8 %
NEUTROPHILS # BLD AUTO: 7.7 K/UL
NEUTROPHILS NFR BLD: 69.5 %
NITRITE UR QL STRIP: NEGATIVE
OPIATES UR QL SCN: NEGATIVE
PCP UR QL SCN>25 NG/ML: NEGATIVE
PH UR STRIP: 6 [PH] (ref 5–8)
PLATELET # BLD AUTO: 320 K/UL
PMV BLD AUTO: 7.4 FL
POTASSIUM SERPL-SCNC: 3.7 MMOL/L
PROT SERPL-MCNC: 7 G/DL
PROT UR QL STRIP: NEGATIVE
RBC # BLD AUTO: 4.22 M/UL
RBC #/AREA URNS HPF: 3 /HPF (ref 0–4)
SALICYLATES SERPL-MCNC: <5 MG/DL
SODIUM SERPL-SCNC: 140 MMOL/L
SP GR UR STRIP: 1.02 (ref 1–1.03)
SQUAMOUS #/AREA URNS HPF: 1 /HPF
TOXICOLOGY INFORMATION: NORMAL
TSH SERPL DL<=0.005 MIU/L-ACNC: 2.18 UIU/ML
URN SPEC COLLECT METH UR: ABNORMAL
UROBILINOGEN UR STRIP-ACNC: NEGATIVE EU/DL
WBC # BLD AUTO: 11.1 K/UL
WBC #/AREA URNS HPF: 5 /HPF (ref 0–5)

## 2018-07-23 PROCEDURE — 81000 URINALYSIS NONAUTO W/SCOPE: CPT | Mod: 59

## 2018-07-23 PROCEDURE — 80307 DRUG TEST PRSMV CHEM ANLYZR: CPT

## 2018-07-23 PROCEDURE — 84443 ASSAY THYROID STIM HORMONE: CPT

## 2018-07-23 PROCEDURE — 99285 EMERGENCY DEPT VISIT HI MDM: CPT | Mod: 25

## 2018-07-23 PROCEDURE — 36415 COLL VENOUS BLD VENIPUNCTURE: CPT

## 2018-07-23 PROCEDURE — 63600175 PHARM REV CODE 636 W HCPCS: Performed by: EMERGENCY MEDICINE

## 2018-07-23 PROCEDURE — 80320 DRUG SCREEN QUANTALCOHOLS: CPT

## 2018-07-23 PROCEDURE — 93010 ELECTROCARDIOGRAM REPORT: CPT | Mod: ,,, | Performed by: INTERNAL MEDICINE

## 2018-07-23 PROCEDURE — 93005 ELECTROCARDIOGRAM TRACING: CPT

## 2018-07-23 PROCEDURE — 80329 ANALGESICS NON-OPIOID 1 OR 2: CPT

## 2018-07-23 PROCEDURE — 96372 THER/PROPH/DIAG INJ SC/IM: CPT

## 2018-07-23 PROCEDURE — 85025 COMPLETE CBC W/AUTO DIFF WBC: CPT

## 2018-07-23 PROCEDURE — 80053 COMPREHEN METABOLIC PANEL: CPT

## 2018-07-23 RX ORDER — HALOPERIDOL 5 MG/ML
5 INJECTION INTRAMUSCULAR
Status: COMPLETED | OUTPATIENT
Start: 2018-07-23 | End: 2018-07-23

## 2018-07-23 RX ORDER — LORAZEPAM 2 MG/ML
2 INJECTION INTRAMUSCULAR
Status: COMPLETED | OUTPATIENT
Start: 2018-07-23 | End: 2018-07-23

## 2018-07-23 RX ORDER — DIPHENHYDRAMINE HYDROCHLORIDE 50 MG/ML
50 INJECTION INTRAMUSCULAR; INTRAVENOUS
Status: COMPLETED | OUTPATIENT
Start: 2018-07-23 | End: 2018-07-23

## 2018-07-23 RX ADMIN — LORAZEPAM 2 MG: 2 INJECTION, SOLUTION INTRAMUSCULAR; INTRAVENOUS at 02:07

## 2018-07-23 RX ADMIN — HALOPERIDOL LACTATE 5 MG: 5 INJECTION, SOLUTION INTRAMUSCULAR at 02:07

## 2018-07-23 RX ADMIN — DIPHENHYDRAMINE HYDROCHLORIDE 50 MG: 50 INJECTION, SOLUTION INTRAMUSCULAR; INTRAVENOUS at 02:07

## 2018-07-23 NOTE — ED NOTES
Spoke with [facility]. States they will be in here within 20 minutes. No other needs identified. Pt in direct visualization of sitter. Will monitor prn.

## 2018-07-23 NOTE — ED NOTES
PEC faxed to and called into both [facility] and [facility] office.    Family request placement at [facility] if possible.

## 2018-07-23 NOTE — ED NOTES
CPT placement accepted by Mai   at  [facility] located at [address] for the service of Dr. Lomeli. Facility is currently waiting for a patient to discharge. Informed Nadine about hold, CPT will accept placement from another [facility] facility if they accept in the meantime.

## 2018-07-23 NOTE — ED NOTES
Admission packet faxed to [[facility]] [facility], [facility], [facility], [facility], [facility], [facility], [[facility][[place]], [facility] [[place], [facility], [facility], [facility], [facility], [[facility], [facility], [facility], [facility], [facility] [[facility], [facility], [facility], [facility], [facility], [facility], [facility], [[facility], [[facility], [facility], [[facility], [facility], [facility], [facility]. Awaiting responses.

## 2018-07-23 NOTE — ED NOTES
[facility], and [facility].

## 2018-07-23 NOTE — ED NOTES
Pt in NAD at d/c. Pt in direct visualization of sitter.  No other needs identified. Will monitor prn.

## 2018-07-23 NOTE — ED NOTES
Spoke with [facility] regarding ETA. States they should be pulling up any minute. Pt updated on status. No other needs identified at this time. Will monitor.

## 2018-07-23 NOTE — ED PROVIDER NOTES
"Encounter Date: 7/23/2018    SCRIBE #1 NOTE: I, Lavonne Payne, am scribing for, and in the presence of, Leo Zamora MD.       History     Chief Complaint   Patient presents with    Psychiatric Evaluation     Per EMS pt becoming more aggitated and violent towards family.   Mother requesting pt be sent to pyschiatric facility.  Was recently at [facility] and would like hm to go back there        Time seen by provider: 1:06 AM on 07/23/2018    Anjel Martin is a 23 y.o. male with a history of Seizures, Schizophrenia, and Psychosis who presents to the ED via EMS for a psychiatric evaluation. EMS reports that the patient has been increasingly aggressive behavior towards his family recently. The patient's mother requested that the patient be admitted for psychiatric evaluation. The patient was admitted to [facility] on 7/2/18.  When brought into the ED via EMS, the patient was yelling "these aren't real ambulance men." Upon evaluation, the patient refused to talk when he was asked why he was brought to the ED today. When asked if he was in any pain, the patient nodded yes and pointed to his right shoulder. Then the patient finally spoke and stated that he had a seizure and fell, but doesn't remember when. He also stated "they gave me something, man."  The patient denies any other symptoms at this time. Current everyday smoker (0.5 ppd). Hx of Drug Abuse and Alcohol Abuse. No pertinent SHx noted. NKDA.      The history is provided by the EMS personnel. History limited by: patient refusing to give history of present situation.      Review of patient's allergies indicates:  No Known Allergies  Past Medical History:   Diagnosis Date    Dislocation of left shoulder joint     History of psychiatric hospitalization     Hx of psychiatric care     Psychiatric disorder     Psychiatric problem     Psychosis 7/3/2018    Seizures     Therapy      No past surgical history on file.  No " family history on file.  Social History   Substance Use Topics    Smoking status: Current Every Day Smoker     Packs/day: 0.50     Types: Cigarettes    Smokeless tobacco: Not on file    Alcohol use No     Review of Systems   Unable to perform ROS: Psychiatric disorder       Physical Exam     Initial Vitals [07/23/18 0108]   BP Pulse Resp Temp SpO2   (!) 141/78 (!) 119 16 98.1 °F (36.7 °C) 96 %      MAP       --         Physical Exam    Nursing note and vitals reviewed.  Constitutional: He appears well-developed and well-nourished. He is not diaphoretic. He is uncooperative. No distress.   HENT:   Head: Normocephalic and atraumatic.   Eyes: EOM are normal. Pupils are equal, round, and reactive to light.   Neck: Normal range of motion. Neck supple.   Cardiovascular: Normal rate, regular rhythm, normal heart sounds and intact distal pulses. Exam reveals no gallop and no friction rub.    No murmur heard.  Pulmonary/Chest: Breath sounds normal. No respiratory distress. He has no wheezes. He has no rhonchi. He has no rales.   Abdominal: Soft. Bowel sounds are normal. There is no tenderness. There is no rebound and no guarding.   Musculoskeletal: Normal range of motion.   Neurological: He is alert and oriented to person, place, and time.   Skin: Skin is warm.   Psychiatric: He has a normal mood and affect. Judgment and thought content normal.   Appears to be responding to internal stimuli. Uncooperative.          ED Course   Procedures  Labs Reviewed   CBC W/ AUTO DIFFERENTIAL - Abnormal; Notable for the following:        Result Value    RBC 4.22 (*)     Hemoglobin 13.2 (*)     Hematocrit 38.6 (*)     MCH 31.2 (*)     MPV 7.4 (*)     All other components within normal limits   URINALYSIS - Abnormal; Notable for the following:     Occult Blood UA 2+ (*)     Leukocytes, UA Trace (*)     All other components within normal limits   ACETAMINOPHEN LEVEL - Abnormal; Notable for the following:     Acetaminophen (Tylenol), Serum  <3.0 (*)     All other components within normal limits   SALICYLATE LEVEL - Abnormal; Notable for the following:     Salicylate Lvl <5.0 (*)     All other components within normal limits   COMPREHENSIVE METABOLIC PANEL   TSH   DRUG SCREEN PANEL, URINE EMERGENCY   ALCOHOL,MEDICAL (ETHANOL)   URINALYSIS MICROSCOPIC     EKG Readings: (Independently Interpreted)   Initial Reading: No STEMI. Rhythm: Sinus Tachycardia. Heart Rate: 106. Ectopy: No Ectopy. Conduction: Normal. ST Segments: Normal ST Segments. T Waves: Normal. Axis: Normal.       Imaging Results    None          Medical Decision Making:   History:   Old Medical Records: I decided to obtain old medical records.  Initial Assessment:   23-year-old male presented with aggressive behavior.  Differential Diagnosis:   Initial differential diagnoses include, but are not limited to: Acute Psychosis, Alcohol Intoxication, Drug Intoxication.   Clinical Tests:   Lab Tests: Ordered and Reviewed  Medical Tests: Ordered and Reviewed  ED Management:  The patient was emergently evaluated in the emergency department, his evaluation was significant for a young male who is uncooperative and appears to be responding to internal stimuli.  The patient's labs showed no acute abnormalities.  The patient's EKG showed no acute abnormalities per my independent interpretation.  The patient's diagnosis is likely acute psychosis.  He was placed under the care of a physician's emergency certificate for his safety.  The patient will require inpatient psychiatric evaluation and treatment.  He is medically cleared for placement into an inpatient psychiatric facility.            Scribe Attestation:   Scribe #1: I performed the above scribed service and the documentation accurately describes the services I performed. I attest to the accuracy of the note.           I, Dr. Leo Zamora, personally performed the services described in this documentation. All medical record entries made by the  cristobal were at my direction and in my presence.  I have reviewed the chart and agree that the record reflects my personal performance and is accurate and complete. Leo Zamora MD.  2:44 AM 07/23/2018       Clinical Impression:     1. Acute psychosis    2. Medical clearance for psychiatric admission                                   Leo Zamora MD  07/23/18 0245

## 2018-08-02 ENCOUNTER — PATIENT OUTREACH (OUTPATIENT)
Dept: ADMINISTRATIVE | Facility: CLINIC | Age: 23
End: 2018-08-02

## 2018-08-02 NOTE — PATIENT INSTRUCTIONS
Psychosis  Psychosis is a symptom of certain mental health problems. It involves perceiving reality differently from those around you. The difference between reality and what you think become blurred in your mind. Other mental health conditions, physical diseases, traumatic experiences, or drugs and toxins may bring on psychotic symptoms or behavior.  There are different kinds of psychosis:  · Drug-induced (due to alcohol, methamphetamine, cocaine, LSD, PCP and others)  · Bipolar disorder  · Depression  · Schizophrenia  · Dementia  Symptoms  The symptoms of psychosis may not all be the same for each person. However, they usually involve:  · Hallucinations. Seeing, hearing, feeling, or even tasting or smelling things that are not there  · Delusions. Believing something that is not true, or false beliefs that are not part of a person's Rastaf[place] or cultural background.  There may also be disturbances in thinking, speech and behavior, which can include:  · Hearing voices that others do not hear  · Seeing things that others do not see  · Racing thoughts  · Lack of energy  · Feeling very fearful  · Disorganized speech  · Intentional or unintentional bodily harm to others  · Paranoia  · Trouble thinking or concentrating clearly  · Depression, feeling suicidal  · Insomnia  · Withdrawal from those around you  Treatment for psychosis depends on the cause. Medicine, with or without psychotherapy, is often used.  Home care  · Find a healthcare provider and therapist who meet your needs.  Seek help when you feel like your symptoms are returning  · Be certain to tell each of your healthcare providers about all of the prescription drugs, over-the-counter medicines, and supplements you take. Certain supplements interact with medicines and can result in dangerous side effects. Ask your pharmacist when you have questions about drug interactions.  · Be sure to take your medicine as directed even if you think you don't need  it.  · Follow-up with lab tests as advised by your healthcare provider.  · Talk with your family about your feelings and thoughts. Ask them to help you recognize any behavior changes so you can get help and, if needed, medicines can be adjusted.  Follow-up care  Follow up with your counselor, therapist or psychiatrist as advised.  Call 911  Call 911 if you:  · Have suicidal thoughts, a suicide plan, and the means to carry out the plan  · Have troubled breathing  · Are very confused  · Are very drowsy or have trouble awakening  · Faint or lose consciousness  · Have a rapid heart rate, very low heart rate, or a new irregular heart rate  · Have a seizure  When to seek medical advice  Call your healthcare provider right away if any of these occur:  · Gradual or rapid return of psychotic symptoms  · Feeling like you want to harm yourself or another  · Feeling extremely depressed  · Feeling very anxious, agitated, or angry  · Feeling out of control or being controlled by others  · Unable to care for yourself  · Seeing things or hearing voices that you know aren't real  Date Last Reviewed: 9/29/2015  © 6827-8849 [employer]. [address] [place] [id number]. All rights reserved. This information is not intended as a substitute for professional medical care. Always follow your healthcare professional's instructions.

## 2018-08-02 NOTE — PROGRESS NOTES
[facility] Script completed with patient mother Donna. Patient sleeping mother report patient had a seizure last night.

## 2018-08-16 ENCOUNTER — HOSPITAL ENCOUNTER (EMERGENCY)
Facility: HOSPITAL | Age: 23
Discharge: HOME | End: 2018-08-16
Attending: EMERGENCY MEDICINE

## 2018-08-16 VITALS
BODY MASS INDEX: 27.23 KG/M2 | WEIGHT: 148 LBS | HEIGHT: 62 IN | TEMPERATURE: 99 F | OXYGEN SATURATION: 97 % | HEART RATE: 93 BPM | DIASTOLIC BLOOD PRESSURE: 66 MMHG | SYSTOLIC BLOOD PRESSURE: 125 MMHG | RESPIRATION RATE: 16 BRPM

## 2018-08-16 DIAGNOSIS — M84.40XA CHRONIC FRACTURE: Primary | ICD-10-CM

## 2018-08-16 PROCEDURE — 25000003 PHARM REV CODE 250: Performed by: EMERGENCY MEDICINE

## 2018-08-16 PROCEDURE — 99283 EMERGENCY DEPT VISIT LOW MDM: CPT | Mod: 25

## 2018-08-16 RX ORDER — OXYCODONE HYDROCHLORIDE 5 MG/1
10 TABLET ORAL
Status: COMPLETED | OUTPATIENT
Start: 2018-08-16 | End: 2018-08-16

## 2018-08-16 RX ORDER — DICLOFENAC SODIUM 50 MG/1
50 TABLET, DELAYED RELEASE ORAL 3 TIMES DAILY
Qty: 15 TABLET | Refills: 0 | Status: SHIPPED | OUTPATIENT
Start: 2018-08-16 | End: 2019-08-16

## 2018-08-16 RX ADMIN — OXYCODONE HYDROCHLORIDE 10 MG: 5 TABLET ORAL at 08:08

## 2018-08-17 NOTE — ED PROVIDER NOTES
Encounter Date: 8/16/2018    SCRIBE #1 NOTE: I, Kitty Fowler , am scribing for, and in the presence of, Dr. Myers .       History     Chief Complaint   Patient presents with    Shoulder Injury     unable to lift left arm-states recent fx to left shoulder per [facility]; pt has seizures daily and reinjured left shoulder today during seizure; strong left radial pulse palapated       Time seen by provider: 8:35 PM on 08/16/2018    Anjel Martin is a 23 y.o. male who presents to the ED with complaints of left shoulder pain with an onset x this PM. The patient relays that he broke his left shoulder x 1 year ago and may have re-injured it today while he was having a seizure. Pain is constant and worse with movement of the left arm. Patient reports that he missed a few doses of his seizure medication 3-4 days ago, but has been in good compliance since then. He relays that he takes Levetiracetam and Topamax. He denies any relief or exacerbation of the pain. No pertinent SHx noted. PMHx of seizures.       The history is provided by the patient.     Review of patient's allergies indicates:  No Known Allergies  Past Medical History:   Diagnosis Date    Dislocation of left shoulder joint     History of psychiatric hospitalization     Hx of psychiatric care     Psychiatric disorder     Psychiatric problem     Psychosis 7/3/2018    Seizures     Therapy      History reviewed. No pertinent surgical history.  History reviewed. No pertinent family history.  Social History     Tobacco Use    Smoking status: Current Every Day Smoker     Packs/day: 0.50     Types: Cigarettes   Substance Use Topics    Alcohol use: No    Drug use: Yes     Types: Marijuana     Review of Systems   Constitutional: Negative for activity change, appetite change, chills, fatigue and fever.   Eyes: Negative for visual disturbance.   Respiratory: Negative for apnea and shortness of breath.    Cardiovascular: Negative for chest pain and  palpitations.   Gastrointestinal: Negative for abdominal distention and abdominal pain.   Genitourinary: Negative for difficulty urinating.   Musculoskeletal: Positive for arthralgias (left shoulder ). Negative for neck pain.   Skin: Negative for pallor and rash.   Neurological: Positive for seizures. Negative for dizziness and headaches.   Hematological: Does not bruise/bleed easily.   Psychiatric/Behavioral: Negative for agitation.       Physical Exam     Initial Vitals [08/16/18 2023]   BP Pulse Resp Temp SpO2   125/69 99 16 98.6 °F (37 °C) 98 %      MAP       --         Physical Exam    Nursing note and vitals reviewed.  Constitutional: He appears well-developed and well-nourished.   HENT:   Head: Normocephalic and atraumatic.   Eyes: Conjunctivae are normal.   Neck: Normal range of motion. Neck supple.   Cardiovascular: Normal rate, regular rhythm and normal heart sounds. Exam reveals no gallop and no friction rub.    No murmur heard.  Pulmonary/Chest: Breath sounds normal. No respiratory distress. He has no wheezes. He has no rhonchi. He has no rales.   Abdominal: Soft. He exhibits no distension. There is no tenderness.   Musculoskeletal: Normal range of motion. He exhibits tenderness.        Left shoulder: He exhibits tenderness. He exhibits normal range of motion, no swelling, no crepitus and no deformity.   Neurological: He is alert and oriented to person, place, and time.   Skin: Skin is warm and dry.   Psychiatric: He has a normal mood and affect.         ED Course   Procedures  Labs Reviewed - No data to display       Imaging Results    None          Medical Decision Making:   History:   Old Medical Records: I decided to obtain old medical records.  Clinical Tests:   Lab Tests: Ordered and Reviewed  Radiological Study: Ordered and Reviewed  ED Management:  23-year-old male with a history of chronic shoulder pain presents with worsening shoulder pain that he suspects may have been injured during a recent  seizure.  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  There is no evidence of acute dislocation.  He is placed in a sling and referred to Orthopedic surgery.            Scribe Attestation:   Scribe #1: I performed the above scribed service and the documentation accurately describes the services I performed. I attest to the accuracy of the note.               Clinical Impression:   The encounter diagnosis was Chronic fracture.      Disposition:   Disposition: Discharged  Condition: Stable                        Ramón Myers III, MD  08/16/18 1110

## 2020-10-23 ENCOUNTER — HOSPITAL ENCOUNTER (EMERGENCY)
Facility: HOSPITAL | Age: 25
Discharge: HOME | End: 2020-10-23
Attending: EMERGENCY MEDICINE
Payer: OTHER GOVERNMENT

## 2020-10-23 VITALS
BODY MASS INDEX: 33.13 KG/M2 | DIASTOLIC BLOOD PRESSURE: 67 MMHG | HEIGHT: 62 IN | RESPIRATION RATE: 18 BRPM | WEIGHT: 180 LBS | TEMPERATURE: 98 F | SYSTOLIC BLOOD PRESSURE: 111 MMHG | OXYGEN SATURATION: 98 % | HEART RATE: 81 BPM

## 2020-10-23 VITALS
SYSTOLIC BLOOD PRESSURE: 104 MMHG | RESPIRATION RATE: 16 BRPM | HEIGHT: 62 IN | DIASTOLIC BLOOD PRESSURE: 63 MMHG | TEMPERATURE: 99 F | BODY MASS INDEX: 33.13 KG/M2 | WEIGHT: 180 LBS | OXYGEN SATURATION: 98 % | HEART RATE: 63 BPM

## 2020-10-23 DIAGNOSIS — R52 BODY ACHES: Primary | ICD-10-CM

## 2020-10-23 DIAGNOSIS — M79.10 MYALGIA: ICD-10-CM

## 2020-10-23 DIAGNOSIS — R53.83 FATIGUE, UNSPECIFIED TYPE: Primary | ICD-10-CM

## 2020-10-23 DIAGNOSIS — R07.9 CHEST PAIN: ICD-10-CM
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
## 2020-10-23 LAB
ALBUMIN SERPL BCP-MCNC: 4.3 G/DL (ref 3.5–5.2)
ALP SERPL-CCNC: 68 U/L (ref 55–135)
ALT SERPL W/O P-5'-P-CCNC: 13 U/L (ref 10–44)
AMPHET+METHAMPHET UR QL: NEGATIVE
ANION GAP SERPL CALC-SCNC: 10 MMOL/L (ref 8–16)
AST SERPL-CCNC: 19 U/L (ref 10–40)
BARBITURATES UR QL SCN>200 NG/ML: NEGATIVE
BASOPHILS # BLD AUTO: 0.06 K/UL (ref 0–0.2)
BASOPHILS NFR BLD: 0.5 % (ref 0–1.9)
BENZODIAZ UR QL SCN>200 NG/ML: NEGATIVE
BILIRUB SERPL-MCNC: 0.7 MG/DL (ref 0.1–1)
BILIRUB UR QL STRIP: NEGATIVE
BUN SERPL-MCNC: 14 MG/DL (ref 6–20)
BZE UR QL SCN: NEGATIVE
CALCIUM SERPL-MCNC: 8.9 MG/DL (ref 8.7–10.5)
CANNABINOIDS UR QL SCN: NEGATIVE
CHLORIDE SERPL-SCNC: 103 MMOL/L (ref 95–110)
CK MB SERPL-MCNC: 2.3 NG/ML (ref 0.1–6.5)
CK SERPL-CCNC: 206 U/L (ref 20–200)
CLARITY UR: CLEAR
CO2 SERPL-SCNC: 28 MMOL/L (ref 23–29)
COLOR UR: YELLOW
CREAT SERPL-MCNC: 0.8 MG/DL (ref 0.5–1.4)
CREAT UR-MCNC: 381 MG/DL (ref 23–375)
DIFFERENTIAL METHOD: ABNORMAL
EOSINOPHIL # BLD AUTO: 0.5 K/UL (ref 0–0.5)
EOSINOPHIL NFR BLD: 4.9 % (ref 0–8)
ERYTHROCYTE [DISTWIDTH] IN BLOOD BY AUTOMATED COUNT: 12.9 % (ref 11.5–14.5)
EST. GFR  (AFRICAN AMERICAN): >60 ML/MIN/1.73 M^2
EST. GFR  (NON AFRICAN AMERICAN): >60 ML/MIN/1.73 M^2
ETHANOL SERPL-MCNC: <5 MG/DL
GLUCOSE SERPL-MCNC: 99 MG/DL (ref 70–110)
GLUCOSE UR QL STRIP: NEGATIVE
HCT VFR BLD AUTO: 38.4 % (ref 40–54)
HGB BLD-MCNC: 12.8 G/DL (ref 14–18)
HGB UR QL STRIP: NEGATIVE
IMM GRANULOCYTES # BLD AUTO: 0.03 K/UL (ref 0–0.04)
IMM GRANULOCYTES NFR BLD AUTO: 0.3 % (ref 0–0.5)
KETONES UR QL STRIP: ABNORMAL
LEUKOCYTE ESTERASE UR QL STRIP: NEGATIVE
LYMPHOCYTES # BLD AUTO: 3.8 K/UL (ref 1–4.8)
LYMPHOCYTES NFR BLD: 35.2 % (ref 18–48)
MAGNESIUM SERPL-MCNC: 2.4 MG/DL (ref 1.6–2.6)
MCH RBC QN AUTO: 30.3 PG (ref 27–31)
MCHC RBC AUTO-ENTMCNC: 33.3 G/DL (ref 32–36)
MCV RBC AUTO: 91 FL (ref 82–98)
MONOCYTES # BLD AUTO: 0.9 K/UL (ref 0.3–1)
MONOCYTES NFR BLD: 8.6 % (ref 4–15)
NEUTROPHILS # BLD AUTO: 5.5 K/UL (ref 1.8–7.7)
NEUTROPHILS NFR BLD: 50.5 % (ref 38–73)
NITRITE UR QL STRIP: NEGATIVE
NRBC BLD-RTO: 0 /100 WBC
OPIATES UR QL SCN: NEGATIVE
PCP UR QL SCN>25 NG/ML: NEGATIVE
PH UR STRIP: 6 [PH] (ref 5–8)
PLATELET # BLD AUTO: 270 K/UL (ref 150–350)
PMV BLD AUTO: 10 FL (ref 9.2–12.9)
POTASSIUM SERPL-SCNC: 3.4 MMOL/L (ref 3.5–5.1)
PROT SERPL-MCNC: 6.9 G/DL (ref 6–8.4)
PROT UR QL STRIP: ABNORMAL
RBC # BLD AUTO: 4.23 M/UL (ref 4.6–6.2)
SARS-COV-2 RDRP RESP QL NAA+PROBE: NEGATIVE
SODIUM SERPL-SCNC: 141 MMOL/L (ref 136–145)
SP GR UR STRIP: >1.03 (ref 1–1.03)
TOXICOLOGY INFORMATION: ABNORMAL
TROPONIN I SERPL DL<=0.01 NG/ML-MCNC: <0.03 NG/ML
URN SPEC COLLECT METH UR: ABNORMAL
UROBILINOGEN UR STRIP-ACNC: ABNORMAL EU/DL
WBC # BLD AUTO: 10.92 K/UL (ref 3.9–12.7)

## 2020-10-23 PROCEDURE — 99284 EMERGENCY DEPT VISIT MOD MDM: CPT | Mod: 25

## 2020-10-23 PROCEDURE — 81003 URINALYSIS AUTO W/O SCOPE: CPT | Mod: 59

## 2020-10-23 PROCEDURE — 80053 COMPREHEN METABOLIC PANEL: CPT

## 2020-10-23 PROCEDURE — 80320 DRUG SCREEN QUANTALCOHOLS: CPT

## 2020-10-23 PROCEDURE — 82550 ASSAY OF CK (CPK): CPT

## 2020-10-23 PROCEDURE — 85025 COMPLETE CBC W/AUTO DIFF WBC: CPT

## 2020-10-23 PROCEDURE — 93005 ELECTROCARDIOGRAM TRACING: CPT | Performed by: INTERNAL MEDICINE

## 2020-10-23 PROCEDURE — 80307 DRUG TEST PRSMV CHEM ANLYZR: CPT

## 2020-10-23 PROCEDURE — 83735 ASSAY OF MAGNESIUM: CPT

## 2020-10-23 PROCEDURE — 99285 EMERGENCY DEPT VISIT HI MDM: CPT | Mod: 25

## 2020-10-23 PROCEDURE — 93010 EKG 12-LEAD: ICD-10-PCS | Mod: ,,, | Performed by: INTERNAL MEDICINE

## 2020-10-23 PROCEDURE — 36415 COLL VENOUS BLD VENIPUNCTURE: CPT

## 2020-10-23 PROCEDURE — 93010 ELECTROCARDIOGRAM REPORT: CPT | Mod: ,,, | Performed by: INTERNAL MEDICINE

## 2020-10-23 PROCEDURE — 82553 CREATINE MB FRACTION: CPT

## 2020-10-23 PROCEDURE — U0002 COVID-19 LAB TEST NON-CDC: HCPCS

## 2020-10-23 PROCEDURE — 84484 ASSAY OF TROPONIN QUANT: CPT

## 2020-10-23 NOTE — ED NOTES
"First encounter with pt. Pt AAO and states "My whole freaking body hurts." Pt states his arms, legs, head, chest and abd hurt. Pt states it started about a week ago. Pt is currently sitting in bed watching tv, vs stable, and has call light within reach. WCTM.  "

## 2020-10-23 NOTE — ED PROVIDER NOTES
Encounter Date: 10/23/2020       History     Chief Complaint   Patient presents with    Weakness     Patient was evaluated this some emergency department several hours ago.  He does have several recent emergency department visits.  Patient reports he has soreness all over.  No fever or chills.  Patient does report some generalized weakness.  There is no headache.  No stiff neck.  No focal weakness.  No shortness of breath.        Review of patient's allergies indicates:   Allergen Reactions    Chocobase [ca phos-k phos-na bicarbonate] Other (See Comments)     sneeze     Past Medical History:   Diagnosis Date    Dislocation of left shoulder joint     History of psychiatric hospitalization     Hx of psychiatric care     Psychiatric disorder     Psychiatric problem     Psychosis 7/3/2018    Seizures     Therapy      No past surgical history on file.  No family history on file.  Social History     Tobacco Use    Smoking status: Current Every Day Smoker     Packs/day: 0.50     Types: Cigarettes   Substance Use Topics    Alcohol use: No    Drug use: Yes     Types: Marijuana     Review of Systems   Constitutional: Negative for chills and fever.   HENT: Negative for sore throat.    Eyes: Negative for photophobia and visual disturbance.   Respiratory: Negative for shortness of breath.    Cardiovascular: Negative for chest pain.   Gastrointestinal: Negative for abdominal pain and vomiting.   Genitourinary: Negative for dysuria.   Musculoskeletal: Negative for joint swelling.   Skin: Negative for rash.   Neurological: Negative for headaches.   Psychiatric/Behavioral: Negative for agitation, confusion and suicidal ideas.       Physical Exam     Initial Vitals   BP Pulse Resp Temp SpO2   10/23/20 0551 10/23/20 0551 10/23/20 0553 10/23/20 0553 10/23/20 0551   124/68 79 18 98.2 °F (36.8 °C) 97 %      MAP       --                Physical Exam    Nursing note and vitals reviewed.  Constitutional: He is not diaphoretic.  No distress.   HENT:   Head: Normocephalic and atraumatic.   Eyes: Conjunctivae are normal.   Neck: Normal range of motion.   Cardiovascular: Regular rhythm.   Pulmonary/Chest: Breath sounds normal.   Abdominal: Soft. There is no abdominal tenderness.   Musculoskeletal: Normal range of motion.   Neurological: He is alert and oriented to person, place, and time. He has normal strength. No cranial nerve deficit or sensory deficit.   2+ patellar reflexes bilaterally.  2+ reflexes bilaterally biceps   Skin:   Patient is ankles and forearms with multiple papules consistent with ant bites in various forms of healing.  No surrounding erythema.   Psychiatric:   Patient is calm and cooperative.  Patient answers all questions appropriately.  No suicidal or homicidal ideation.  No delusions or hallucinations.         ED Course   Procedures  Labs Reviewed   CK - Abnormal; Notable for the following components:       Result Value     (*)     All other components within normal limits   TROPONIN I   CK-MB   MAGNESIUM        ECG Results          EKG 12-lead (In process)  Result time 10/23/20 07:09:31    In process by Interface, Lab In [facility] (10/23/20 07:09:31)                 Narrative:    Test Reason : R07.9,    Vent. Rate : 071 BPM     Atrial Rate : 071 BPM     P-R Int : 124 ms          QRS Dur : 092 ms      QT Int : 402 ms       P-R-T Axes : 044 030 031 degrees     QTc Int : 436 ms    Sinus rhythm with marked sinus arrythmia  Otherwise normal ECG  When compared with ECG of 23-JUL-2018 01:50,  Vent. rate has decreased BY  35 BPM    Referred By: [email or web address]   SELF           Confirmed By:                             Imaging Results          X-Ray Chest AP Portable (In process)                  Medical Decision Making:   History:   Old Medical Records: I decided to obtain old medical records.  Clinical Tests:   Lab Tests: Reviewed  Radiological Study: Reviewed  Medical Tests: Reviewed  ED Management:  Patient presents with  generalized pain.  Similarly patient had generalized weakness on other recent emergency department visits.  Given generalized myalgias CPK obtain to rule out rhabdomyolysis.  Creatinine was normal from previous visit.  CPK is only 204.  Patient with no focal neurologic deficits.  Other rare diagnosis entertained such as Guillain-Custar.  Patient has no weakness on physical exam.  Symptoms been going on several days.  Deep tendon reflexes are preserved.  There is no evidence of psychosis.  There is no suicidal or homicidal ideation.                             Clinical Impression:       ICD-10-CM ICD-9-CM   1. Fatigue, unspecified type  R53.83 780.79   2. Chest pain  R07.9 786.50   3. Myalgia  M79.10 729.1   4. Insect bite, unspecified site, initial encounter  W57.XXXA 919.4     E906.4                          ED Disposition Condition    Discharge Stable        ED Prescriptions     None        Follow-up Information    None                                      Khang Arnold MD  10/23/20 5287

## 2020-10-23 NOTE — ED PROVIDER NOTES
Encounter Date: 10/23/2020       History     Chief Complaint   Patient presents with    hurting all over     25-year-old male presents complaining of headache patient also complains of body aches, patient reports he has had these symptoms for the past day or 2 patient reports that he had a fall 2 days ago and this may be the cause of his headache patient has no other complaints at this time.        Review of patient's allergies indicates:   Allergen Reactions    Chocobase [ca phos-k phos-na bicarbonate] Other (See Comments)     sneeze     Past Medical History:   Diagnosis Date    Dislocation of left shoulder joint     History of psychiatric hospitalization      of psychiatric care     Psychiatric disorder     Psychiatric problem     Psychosis 7/3/2018    Seizures     Therapy      No past surgical history on file.  No family history on file.  Social History     Tobacco Use    Smoking status: Current Every Day Smoker     Packs/day: 0.50     Types: Cigarettes   Substance Use Topics    Alcohol use: No    Drug use: Yes     Types: Marijuana     Review of Systems   Constitutional: Negative for fever.   HENT: Negative for congestion, rhinorrhea, sore throat and trouble swallowing.    Eyes: Negative for visual disturbance.   Respiratory: Negative for cough, chest tightness, shortness of breath and wheezing.    Cardiovascular: Negative for chest pain, palpitations and leg swelling.   Gastrointestinal: Negative for abdominal distention, abdominal pain, constipation, diarrhea, nausea and vomiting.   Genitourinary: Negative for difficulty urinating, dysuria, flank pain and frequency.   Musculoskeletal: Negative for arthralgias, back pain, joint swelling and neck pain.   Skin: Negative for color change and rash.   Neurological: Positive for headaches. Negative for dizziness, syncope, speech difficulty, weakness and numbness.   All other systems reviewed and are negative.      Physical Exam     Initial Vitals  [10/23/20 0102]   BP Pulse Resp Temp SpO2   121/76 64 16 98.9 °F (37.2 °C) 99 %      MAP       --         Physical Exam    Nursing note and vitals reviewed.  Constitutional: He appears well-developed and well-nourished. He is not diaphoretic. No distress.   HENT:   Head: Normocephalic and atraumatic.   Right Ear: External ear normal.   Left Ear: External ear normal.   Nose: Nose normal.   Mouth/Throat: Oropharynx is clear and moist. No oropharyngeal exudate.   Eyes: Conjunctivae and EOM are normal. Pupils are equal, round, and reactive to light. Right eye exhibits no discharge. Left eye exhibits no discharge. No scleral icterus.   Neck: Normal range of motion. Neck supple. No thyromegaly present. No tracheal deviation present. No JVD present.   Cardiovascular: Normal rate, regular rhythm, normal heart sounds and intact distal pulses. Exam reveals no gallop and no friction rub.    No murmur heard.  Pulmonary/Chest: Breath sounds normal. No stridor. No respiratory distress. He has no wheezes. He has no rhonchi. He has no rales. He exhibits no tenderness.   Abdominal: Soft. Bowel sounds are normal. He exhibits no distension and no mass. There is no abdominal tenderness. There is no rebound and no guarding.   Musculoskeletal: Normal range of motion. No tenderness or edema.   Lymphadenopathy:     He has no cervical adenopathy.   Neurological: He is alert and oriented to person, place, and time. He has normal strength and normal reflexes. He displays normal reflexes. No cranial nerve deficit or sensory deficit.   Skin: Skin is warm and dry. No rash noted. No erythema.   Mild abrasions noted to the forehead no active bleeding noted no acute abnormalities patient reports his tetanus was 3 years ago         ED Course   Procedures  Labs Reviewed   CBC W/ AUTO DIFFERENTIAL - Abnormal; Notable for the following components:       Result Value    RBC 4.23 (*)     Hemoglobin 12.8 (*)     Hematocrit 38.4 (*)     All other  components within normal limits   COMPREHENSIVE METABOLIC PANEL - Abnormal; Notable for the following components:    Potassium 3.4 (*)     All other components within normal limits   URINALYSIS - Abnormal; Notable for the following components:    Specific Gravity, UA >1.030 (*)     Protein, UA Trace (*)     Ketones, UA Trace (*)     Urobilinogen, UA 2.0-3.0 (*)     All other components within normal limits    Narrative:     Specimen Source->Urine   DRUG SCREEN PANEL, URINE EMERGENCY - Abnormal; Notable for the following components:    Creatinine, Random Ur 381.0 (*)     All other components within normal limits    Narrative:     Specimen Source->Urine   ALCOHOL,MEDICAL (ETHANOL)   SARS-COV-2 RNA AMPLIFICATION, QUAL          Imaging Results          CT Head Without Contrast (Final result)  Result time 10/23/20 02:42:00    Final result by Wilda Herrmann MD (10/23/20 02:42:00)                 Narrative:    EXAM:  CT Head Without Intravenous Contrast    CLINICAL HISTORY:  The patient is 25 years old and is Male; Headache, intracranial hemorrhage suspected    TECHNIQUE:  Axial computed tomography images of the head/brain without intravenous contrast.  Sagittal and coronal reformatted images were created and reviewed.  This CT exam was performed using one or more of the following dose reduction techniques:  automated exposure control, adjustment of the mA and/or kV according to patient size, and/or use of iterative reconstruction technique.    COMPARISON:  CT of the head July 1, 2018    FINDINGS:  BRAIN:  Unremarkable.  The gray-white matter differentiation is preserved . No hemorrhage.  No significant white matter disease.  No edema. No extra-axial fluid collections.  VENTRICLES:  Unremarkable.  No ventriculomegaly.  BONES/JOINTS:  No acute fracture.  SOFT TISSUES:  Unremarkable.  SINUSES:  Unremarkable as visualized.  No acute sinusitis.  MASTOID AIR CELLS:  Unremarkable as visualized.  No mastoid effusion.  ORBITS:   Unremarkable as visualized.    IMPRESSION:  No acute intracranial findings.    Electronically signed by:  Wilda Herrmann MD  10/23/2020 3:44 AM CDT Workstation: [id number]                               Medical Decision Making:   History:   Old Medical Records: I decided to obtain old medical records.  Initial Assessment:   Emergent evaluation of a 25-year-old male presenting with headache status post fall 2 days ago differential diagnosis includes intracranial injury, concussion, infection              Attending Attestation:             Attending ED Notes:   Patient's CT of the head is within normal limits patient's labs show no acute abnormalities patient to start on ibuprofen for his discomfort and is to follow up with PCP in the next 1-2 days for re-evaluation and further management patient is cautioned to return immediately to the emergency department for any worsening or for any further concerns.                    Clinical Impression:     ICD-10-CM ICD-9-CM   1. Body aches  R52 780.96                          ED Disposition Condition    Discharge Stable        ED Prescriptions     None        Follow-up Information     Follow up With Specialties Details Why Contact Info Additional Information    [facility] Emergency Medicine  If symptoms worsen [address]  [facility] [address]  [phone] 1st floor    [facility]  Schedule an appointment as soon as possible for a visit in 2 days  [address]  [facility] [address]  [phone]                                          Sumit Collins MD  10/23/20 0524

## 2022-07-24 ENCOUNTER — HOSPITAL ENCOUNTER (EMERGENCY)
Facility: HOSPITAL | Age: 27
Discharge: HOME | End: 2022-07-25
Attending: EMERGENCY MEDICINE

## 2022-07-24 DIAGNOSIS — G40.919 BREAKTHROUGH SEIZURE: Primary | ICD-10-CM

## 2022-07-24 PROCEDURE — 99284 EMERGENCY DEPT VISIT MOD MDM: CPT

## 2022-07-24 RX ORDER — LEVETIRACETAM 5 MG/ML
500 INJECTION INTRAVASCULAR
Status: COMPLETED | OUTPATIENT
Start: 2022-07-25 | End: 2022-07-25

## 2022-07-24 RX ORDER — DIAZEPAM 10 MG/2ML
5 INJECTION INTRAMUSCULAR
Status: COMPLETED | OUTPATIENT
Start: 2022-07-25 | End: 2022-07-25

## 2022-07-25 ENCOUNTER — HOSPITAL ENCOUNTER (EMERGENCY)
Facility: HOSPITAL | Age: 27
Discharge: HOME | End: 2022-07-25
Attending: EMERGENCY MEDICINE
Payer: MEDICAID

## 2022-07-25 VITALS
TEMPERATURE: 98 F | SYSTOLIC BLOOD PRESSURE: 98 MMHG | DIASTOLIC BLOOD PRESSURE: 55 MMHG | HEART RATE: 75 BPM | OXYGEN SATURATION: 95 % | RESPIRATION RATE: 16 BRPM

## 2022-07-25 VITALS
HEART RATE: 69 BPM | TEMPERATURE: 99 F | SYSTOLIC BLOOD PRESSURE: 120 MMHG | BODY MASS INDEX: 29.44 KG/M2 | HEIGHT: 62 IN | WEIGHT: 160 LBS | OXYGEN SATURATION: 98 % | DIASTOLIC BLOOD PRESSURE: 58 MMHG | RESPIRATION RATE: 18 BRPM

## 2022-07-25 DIAGNOSIS — R56.9 SEIZURE-LIKE ACTIVITY: Primary | ICD-10-CM

## 2022-07-25 LAB
ALBUMIN SERPL BCP-MCNC: 3.4 G/DL (ref 3.5–5.2)
ALBUMIN SERPL BCP-MCNC: 3.7 G/DL (ref 3.5–5.2)
ALP SERPL-CCNC: 56 U/L (ref 55–135)
ALP SERPL-CCNC: 56 U/L (ref 55–135)
ALT SERPL W/O P-5'-P-CCNC: 10 U/L (ref 10–44)
ALT SERPL W/O P-5'-P-CCNC: 14 U/L (ref 10–44)
ANION GAP SERPL CALC-SCNC: 4 MMOL/L (ref 8–16)
ANION GAP SERPL CALC-SCNC: 8 MMOL/L (ref 8–16)
AST SERPL-CCNC: 17 U/L (ref 10–40)
AST SERPL-CCNC: 25 U/L (ref 10–40)
BASOPHILS # BLD AUTO: 0.06 K/UL (ref 0–0.2)
BASOPHILS # BLD AUTO: 0.07 K/UL (ref 0–0.2)
BASOPHILS NFR BLD: 0.6 % (ref 0–1.9)
BASOPHILS NFR BLD: 0.7 % (ref 0–1.9)
BILIRUB SERPL-MCNC: 0.2 MG/DL (ref 0.1–1)
BILIRUB SERPL-MCNC: 0.5 MG/DL (ref 0.1–1)
BUN SERPL-MCNC: 7 MG/DL (ref 6–20)
BUN SERPL-MCNC: 8 MG/DL (ref 6–20)
CALCIUM SERPL-MCNC: 8.2 MG/DL (ref 8.7–10.5)
CALCIUM SERPL-MCNC: 8.4 MG/DL (ref 8.7–10.5)
CHLORIDE SERPL-SCNC: 105 MMOL/L (ref 95–110)
CHLORIDE SERPL-SCNC: 108 MMOL/L (ref 95–110)
CO2 SERPL-SCNC: 22 MMOL/L (ref 23–29)
CO2 SERPL-SCNC: 25 MMOL/L (ref 23–29)
CREAT SERPL-MCNC: 0.8 MG/DL (ref 0.5–1.4)
CREAT SERPL-MCNC: 0.9 MG/DL (ref 0.5–1.4)
DIFFERENTIAL METHOD: ABNORMAL
DIFFERENTIAL METHOD: ABNORMAL
EOSINOPHIL # BLD AUTO: 0.5 K/UL (ref 0–0.5)
EOSINOPHIL # BLD AUTO: 0.6 K/UL (ref 0–0.5)
EOSINOPHIL NFR BLD: 5 % (ref 0–8)
EOSINOPHIL NFR BLD: 5.9 % (ref 0–8)
ERYTHROCYTE [DISTWIDTH] IN BLOOD BY AUTOMATED COUNT: 13 % (ref 11.5–14.5)
ERYTHROCYTE [DISTWIDTH] IN BLOOD BY AUTOMATED COUNT: 13.2 % (ref 11.5–14.5)
EST. GFR  (AFRICAN AMERICAN): >60 ML/MIN/1.73 M^2
EST. GFR  (AFRICAN AMERICAN): >60 ML/MIN/1.73 M^2
EST. GFR  (NON AFRICAN AMERICAN): >60 ML/MIN/1.73 M^2
EST. GFR  (NON AFRICAN AMERICAN): >60 ML/MIN/1.73 M^2
GLUCOSE SERPL-MCNC: 93 MG/DL (ref 70–110)
GLUCOSE SERPL-MCNC: 98 MG/DL (ref 70–110)
HCT VFR BLD AUTO: 33 % (ref 40–54)
HCT VFR BLD AUTO: 34.8 % (ref 40–54)
HGB BLD-MCNC: 11.2 G/DL (ref 14–18)
HGB BLD-MCNC: 11.4 G/DL (ref 14–18)
IMM GRANULOCYTES # BLD AUTO: 0.03 K/UL (ref 0–0.04)
IMM GRANULOCYTES # BLD AUTO: 0.04 K/UL (ref 0–0.04)
IMM GRANULOCYTES NFR BLD AUTO: 0.3 % (ref 0–0.5)
IMM GRANULOCYTES NFR BLD AUTO: 0.4 % (ref 0–0.5)
LYMPHOCYTES # BLD AUTO: 2.1 K/UL (ref 1–4.8)
LYMPHOCYTES # BLD AUTO: 2.4 K/UL (ref 1–4.8)
LYMPHOCYTES NFR BLD: 21.4 % (ref 18–48)
LYMPHOCYTES NFR BLD: 26.1 % (ref 18–48)
MCH RBC QN AUTO: 30.2 PG (ref 27–31)
MCH RBC QN AUTO: 31.1 PG (ref 27–31)
MCHC RBC AUTO-ENTMCNC: 32.8 G/DL (ref 32–36)
MCHC RBC AUTO-ENTMCNC: 33.9 G/DL (ref 32–36)
MCV RBC AUTO: 92 FL (ref 82–98)
MCV RBC AUTO: 92 FL (ref 82–98)
MONOCYTES # BLD AUTO: 0.8 K/UL (ref 0.3–1)
MONOCYTES # BLD AUTO: 0.8 K/UL (ref 0.3–1)
MONOCYTES NFR BLD: 7.9 % (ref 4–15)
MONOCYTES NFR BLD: 8.1 % (ref 4–15)
NEUTROPHILS # BLD AUTO: 5.6 K/UL (ref 1.8–7.7)
NEUTROPHILS # BLD AUTO: 6.2 K/UL (ref 1.8–7.7)
NEUTROPHILS NFR BLD: 59.9 % (ref 38–73)
NEUTROPHILS NFR BLD: 63.7 % (ref 38–73)
NRBC BLD-RTO: 0 /100 WBC
NRBC BLD-RTO: 0 /100 WBC
PLATELET # BLD AUTO: 244 K/UL (ref 150–450)
PLATELET # BLD AUTO: 260 K/UL (ref 150–450)
PMV BLD AUTO: 9.6 FL (ref 9.2–12.9)
PMV BLD AUTO: 9.7 FL (ref 9.2–12.9)
POTASSIUM SERPL-SCNC: 3.4 MMOL/L (ref 3.5–5.1)
POTASSIUM SERPL-SCNC: 3.7 MMOL/L (ref 3.5–5.1)
PROT SERPL-MCNC: 5.7 G/DL (ref 6–8.4)
PROT SERPL-MCNC: 6.2 G/DL (ref 6–8.4)
RBC # BLD AUTO: 3.6 M/UL (ref 4.6–6.2)
RBC # BLD AUTO: 3.78 M/UL (ref 4.6–6.2)
SODIUM SERPL-SCNC: 134 MMOL/L (ref 136–145)
SODIUM SERPL-SCNC: 138 MMOL/L (ref 136–145)
WBC # BLD AUTO: 9.34 K/UL (ref 3.9–12.7)
WBC # BLD AUTO: 9.74 K/UL (ref 3.9–12.7)

## 2022-07-25 PROCEDURE — 80053 COMPREHEN METABOLIC PANEL: CPT | Performed by: EMERGENCY MEDICINE

## 2022-07-25 PROCEDURE — 99284 EMERGENCY DEPT VISIT MOD MDM: CPT | Mod: 25

## 2022-07-25 PROCEDURE — 96365 THER/PROPH/DIAG IV INF INIT: CPT

## 2022-07-25 PROCEDURE — 85025 COMPLETE CBC W/AUTO DIFF WBC: CPT | Mod: 91 | Performed by: PHYSICIAN ASSISTANT

## 2022-07-25 PROCEDURE — 96375 TX/PRO/DX INJ NEW DRUG ADDON: CPT

## 2022-07-25 PROCEDURE — 80053 COMPREHEN METABOLIC PANEL: CPT | Mod: 91 | Performed by: PHYSICIAN ASSISTANT

## 2022-07-25 PROCEDURE — 36415 COLL VENOUS BLD VENIPUNCTURE: CPT | Performed by: PHYSICIAN ASSISTANT

## 2022-07-25 PROCEDURE — 96360 HYDRATION IV INFUSION INIT: CPT

## 2022-07-25 PROCEDURE — 80177 DRUG SCRN QUAN LEVETIRACETAM: CPT | Performed by: EMERGENCY MEDICINE

## 2022-07-25 PROCEDURE — 85025 COMPLETE CBC W/AUTO DIFF WBC: CPT | Performed by: EMERGENCY MEDICINE

## 2022-07-25 PROCEDURE — 25000003 PHARM REV CODE 250: Performed by: PHYSICIAN ASSISTANT

## 2022-07-25 PROCEDURE — 63600175 PHARM REV CODE 636 W HCPCS: Performed by: EMERGENCY MEDICINE

## 2022-07-25 RX ORDER — POTASSIUM CHLORIDE 750 MG/1
30 TABLET, EXTENDED RELEASE ORAL
Status: COMPLETED | OUTPATIENT
Start: 2022-07-25 | End: 2022-07-25

## 2022-07-25 RX ADMIN — SODIUM CHLORIDE 1000 ML: 0.9 INJECTION, SOLUTION INTRAVENOUS at 03:07

## 2022-07-25 RX ADMIN — DIAZEPAM 5 MG: 5 INJECTION, SOLUTION INTRAMUSCULAR; INTRAVENOUS at 12:07

## 2022-07-25 RX ADMIN — LEVETIRACETAM 500 MG: 5 INJECTION INTRAVENOUS at 12:07

## 2022-07-25 RX ADMIN — POTASSIUM CHLORIDE 30 MEQ: 750 TABLET, EXTENDED RELEASE ORAL at 04:07

## 2022-07-25 NOTE — ED NOTES
Pt having seizure. Rolled on side and valium administered. Pt in post ictal state now. Seizure precautions in place. Pt placed on nasal cannula.

## 2022-07-25 NOTE — ED PROVIDER NOTES
Encounter Date: 7/24/2022       History     Chief Complaint   Patient presents with    Seizures     Postictal after     Patient here with reported seizure activity noted at the group home he does have a history of seizures reportedly is compliant with his Keppra despite this continues to have breakthrough seizures states he has been on the same dose of Keppra for over a year now EMS reports that he had a bottle of Keppra at the facility which had been filled in March and was still have full has a question whether not he is truly compliant with medications        Review of patient's allergies indicates:   Allergen Reactions    Chocobase [ca phos-k phos-na bicarbonate] Other (See Comments)     sneeze     Past Medical History:   Diagnosis Date    Dislocation of left shoulder joint     History of psychiatric hospitalization     Hx of psychiatric care     Psychiatric disorder     Psychiatric problem     Psychosis 7/3/2018    Seizures     Therapy      No past surgical history on file.  No family history on file.  Social History     Tobacco Use    Smoking status: Current Every Day Smoker     Packs/day: 0.50     Types: Cigarettes   Substance Use Topics    Alcohol use: No    Drug use: Yes     Types: Marijuana     Review of Systems   Constitutional: Negative for chills and fever.   HENT: Negative for congestion.    Respiratory: Negative for cough.    Cardiovascular: Negative for chest pain.   Neurological: Positive for seizures. Negative for headaches.   All other systems reviewed and are negative.      Physical Exam     Initial Vitals   BP Pulse Resp Temp SpO2   07/25/22 0011 07/25/22 0011 07/25/22 0015 07/25/22 0011 07/25/22 0011   114/69 88 14 98.4 °F (36.9 °C) (!) 94 %      MAP       --                Physical Exam    Constitutional: He appears well-developed and well-nourished. No distress.   HENT:   Head: Normocephalic and atraumatic.   Right Ear: External ear normal.   Left Ear: External ear normal.    Mouth/Throat: Oropharynx is clear and moist.   Eyes: Pupils are equal, round, and reactive to light.   Neck: Neck supple.   Normal range of motion.  Cardiovascular: Normal rate, regular rhythm, S1 normal, S2 normal, normal heart sounds and intact distal pulses.   Pulmonary/Chest: Breath sounds normal. No respiratory distress.   Abdominal: Abdomen is soft. Bowel sounds are normal. There is no abdominal tenderness.   Musculoskeletal:         General: Normal range of motion.      Cervical back: Normal range of motion and neck supple.     Neurological: He is alert and oriented to person, place, and time. He has normal strength. No cranial nerve deficit. GCS score is 15. GCS eye subscore is 4. GCS verbal subscore is 5. GCS motor subscore is 6.   Skin: Skin is warm and dry. No rash noted.   Psychiatric: He has a normal mood and affect. His behavior is normal.         ED Course   Procedures  Labs Reviewed   CBC W/ AUTO DIFFERENTIAL - Abnormal; Notable for the following components:       Result Value    RBC 3.78 (*)     Hemoglobin 11.4 (*)     Hematocrit 34.8 (*)     Eos # 0.6 (*)     All other components within normal limits    Narrative:     Recoll. [id number] by [email or web address] at 07/25/2022 00:50, reason: Specimen   clotted;notified Emmanuel Baldwin RN (ER) @ 00:50   COMPREHENSIVE METABOLIC PANEL - Abnormal; Notable for the following components:    Sodium 134 (*)     Calcium 8.2 (*)     Anion Gap 4 (*)     All other components within normal limits   LEVETIRACETAM  (KEPPRA) LEVEL          Imaging Results    None          Medications   diazePAM injection 5 mg (5 mg Intravenous Given 7/25/22 0048)   levETIRAcetam in NaCl (iso-os) IVPB 500 mg (0 mg Intravenous Stopped 7/25/22 0120)     Medical Decision Making:   ED Management:  Patient did have 1 additional seizure in the emergency department prior to receiving Valium and Keppra patient reports that he is compliant with medications however the amount of medication that he had at his  home seemed too much if he was compliant will refer patient to neurology outpatient follow-up                      Clinical Impression:   Final diagnoses:  [G40.919] Breakthrough seizure (Primary)          ED Disposition Condition    Discharge Stable        ED Prescriptions     None        Follow-up Information     Follow up With Specialties Details Why Contact Info    Andreas Coronado MD Vascular Neurology, Neurology Call in 1 day for further evaluation and treatment [address] [facility] [address]  [phone]             Lito Silva MD  07/25/22 0256       Lito Silva MD  07/25/22 0257

## 2022-07-25 NOTE — ED PROVIDER NOTES
Encounter Date: 7/25/2022    SCRIBE #1 NOTE: I, Toyin Lin, am scribing for, and in the presence of, Renee Ramirez PA-C.       History     Chief Complaint   Patient presents with    Seizures     Pt comes in via ems with c/o seizures pt staying at the [facility] when witnesses say pt had a seizure pt does not think he had one      Time seen by provider: 2:51 PM on 07/25/2022    Anjel Martin is a 27 y.o. male who presents to the ED with an unwitnessed seizure that occurred this morning. Pt endorses that seizures are brought on by stress, but he is unsure if he had one this morning. He reports he did not have a seizure today. Pt takes keppra and denies missing a dose. Pt denies alcohol and drug use. Pt reports he has been walking for the last 2 weeks. Pt states he sometimes has a seizure once a week. Pt feels normal now. The patient denies fever, SOB, abdominal pain, or any other symptoms at this time. PMHx of seizures, psychiatric disorder, and psychosis. No PSHx.     The history is provided by the patient and the EMS personnel.     Review of patient's allergies indicates:   Allergen Reactions    Chocobase [ca phos-k phos-na bicarbonate] Other (See Comments)     sneeze     Past Medical History:   Diagnosis Date    Dislocation of left shoulder joint     History of psychiatric hospitalization     Hx of psychiatric care     Psychiatric disorder     Psychiatric problem     Psychosis 7/3/2018    Seizures     Therapy      No past surgical history on file.  No family history on file.  Social History     Tobacco Use    Smoking status: Current Every Day Smoker     Packs/day: 0.50     Types: Cigarettes   Substance Use Topics    Alcohol use: No    Drug use: Yes     Types: Marijuana     Review of Systems   Constitutional: Negative for activity change, appetite change, chills and fever.   HENT: Negative for congestion, rhinorrhea and sore throat.    Eyes: Negative for redness and visual disturbance.    Respiratory: Negative for cough, chest tightness and shortness of breath.    Cardiovascular: Negative for chest pain.   Gastrointestinal: Negative for abdominal pain, diarrhea, nausea and vomiting.   Genitourinary: Negative for dysuria and frequency.   Musculoskeletal: Negative for back pain, neck pain and neck stiffness.   Skin: Negative for rash.   Neurological: Positive for seizures. Negative for dizziness, syncope, numbness and headaches.       Physical Exam     Initial Vitals   BP Pulse Resp Temp SpO2   07/25/22 1421 07/25/22 1421 07/25/22 1421 07/25/22 1542 07/25/22 1421   117/73 84 18 98.5 °F (36.9 °C) 98 %      MAP       --                Physical Exam    Nursing note and vitals reviewed.  Constitutional: Vital signs are normal. He appears well-developed and well-nourished. He is cooperative.  Non-toxic appearance. He does not have a sickly appearance.   HENT:   Head: Normocephalic and atraumatic.   Right Ear: External ear normal.   Left Ear: External ear normal.   Nose: Nose normal.   Eyes: Conjunctivae and lids are normal. Pupils are equal, round, and reactive to light.   Neck: Neck supple.   Normal range of motion.   Full passive range of motion without pain.     Cardiovascular: Normal rate, regular rhythm and normal heart sounds. Exam reveals no gallop and no friction rub.    No murmur heard.  Pulmonary/Chest: Breath sounds normal. He has no wheezes. He has no rhonchi. He has no rales.   Abdominal: Abdomen is soft. There is no abdominal tenderness. There is no rebound and no guarding.   Musculoskeletal:      Cervical back: Full passive range of motion without pain, normal range of motion and neck supple.     Neurological: He is alert and oriented to person, place, and time.   No focal neurological deficits noted.  Cranial nerves III-XII grossly intact.  Equal, rapid alternating movements noted to bilateral upper and lower extremities.      Skin: Skin is warm, dry and intact. No rash noted.         ED  Course   Procedures  Labs Reviewed   CBC W/ AUTO DIFFERENTIAL - Abnormal; Notable for the following components:       Result Value    RBC 3.60 (*)     Hemoglobin 11.2 (*)     Hematocrit 33.0 (*)     MCH 31.1 (*)     All other components within normal limits   COMPREHENSIVE METABOLIC PANEL - Abnormal; Notable for the following components:    Potassium 3.4 (*)     CO2 22 (*)     Calcium 8.4 (*)     Total Protein 5.7 (*)     Albumin 3.4 (*)     All other components within normal limits          Imaging Results    None          Medications   sodium chloride 0.9% bolus 1,000 mL (0 mLs Intravenous Stopped 7/25/22 1606)   potassium chloride SA CR tablet 30 mEq (30 mEq Oral Given 7/25/22 1631)     Medical Decision Making:   History:   Old Medical Records: I decided to obtain old medical records.  Clinical Tests:   Lab Tests: Ordered and Reviewed       APC / Resident Notes:   Urgent evaluation of a 27-year-old male who presents for reported seizure activity.  He has a history of seizures for which he reports he is compliant with his Keppra.  He states he did not have a seizure today that he was sleeping when he was woken up.  He is alert and oriented.  He got off the ambulance stretcher, walked outside smoking a cigarette and walked back in.  He has normal gait.  Has normal neurological exam.  He had a Keppra level obtained yesterday which is not resulted.  Repeat labs show mild hypokalemia.  He was given oral potassium in the ER.  Recommend close follow-up with Neurology and continue current medications. Discussed results with patient. Return precautions given. Based on my clinical evaluation, I do not appreciate any immediate, emergent, or life threatening condition or etiology that warrants additional workup today and feel that the patient can be discharged with close follow up care.  Patient is to follow up with their primary care provider. Case was discussed with Dr. Desai who is in agreement with the plan of care.  All questions answered.        Scribe Attestation:   Scribe #1: I performed the above scribed service and the documentation accurately describes the services I performed. I attest to the accuracy of the note.              I, Renee Ramirez PA-C, personally performed the services described in this documentation. All medical record entries made by the scribe were at my direction and in my presence.  I have reviewed the chart and agree that the record reflects my personal performance and is accurate and complete. Renee Ramirez PA-C.  4:40 PM 07/25/2022      Clinical Impression:   Final diagnoses:  [R56.9] Seizure-like activity (Primary)          ED Disposition Condition    Discharge Stable        ED Prescriptions     None        Follow-up Information     Follow up With Specialties Details Why Contact Info    Andreas Coronado MD Vascular Neurology, Neurology   [address] [place] [address]  [phone]      [facility] Emergency Dept Emergency Medicine  As needed [address] [place] [id number] [phone]           Renee Ramirez PA-C  07/25/22 0406

## 2022-07-25 NOTE — DISCHARGE INSTRUCTIONS
Continue your routine seizure medication.  Follow up with neurology.  For worsening symptoms, chest pain, shortness of breath, increased abdominal pain, high grade fever, stroke or stroke like symptoms, immediately go to the nearest Emergency Room or call 911 as soon as possible.

## 2022-07-25 NOTE — PLAN OF CARE
"Abdulkadir spoke with RADHA Eagle in ER. Cm updated by RADHA Eagle, "pt need placement."  Abdulkadir called  [facility] facility. Abdulkadir spoke with Mr Carter at [phone].   Cm asked if this pt has been to his facility. Per Mr Carter, Pt has been to the facility and is not welcomed back. Pt cannot go to the facility in [place], as well."  Non compliant." Abdulkadir updated RADHA Eagle in ER.  "

## 2022-07-28 LAB — LEVETIRACETAM SERPL-MCNC: 46.5 UG/ML (ref 10–40)
